# Patient Record
Sex: FEMALE | Race: WHITE | NOT HISPANIC OR LATINO | Employment: OTHER | URBAN - METROPOLITAN AREA
[De-identification: names, ages, dates, MRNs, and addresses within clinical notes are randomized per-mention and may not be internally consistent; named-entity substitution may affect disease eponyms.]

---

## 2018-03-23 LAB — HCV AB SER-ACNC: NEGATIVE

## 2019-05-22 LAB
CHOLEST SERPL-MSCNC: 231 MG/DL (ref 0–200)
HDL/CHOLESTEROL RATIO: 3.3
HDLC SERPL-MCNC: 71 MG/DL
LDLC SERPL CALC-MCNC: 139 MG/DL
TRIGL SERPL-MCNC: 104 MG/DL

## 2020-01-31 ENCOUNTER — PATIENT MESSAGE (OUTPATIENT)
Dept: FAMILY MEDICINE | Facility: CLINIC | Age: 70
End: 2020-01-31

## 2020-01-31 ENCOUNTER — PATIENT OUTREACH (OUTPATIENT)
Dept: ADMINISTRATIVE | Facility: HOSPITAL | Age: 70
End: 2020-01-31

## 2020-01-31 PROBLEM — M85.89 OSTEOPENIA OF MULTIPLE SITES: Status: ACTIVE | Noted: 2018-03-20

## 2020-01-31 PROBLEM — Z90.79 STATUS POST TAH-BSO: Status: ACTIVE | Noted: 2019-05-01

## 2020-01-31 PROBLEM — Z97.4 DOES USE HEARING AID: Status: ACTIVE | Noted: 2018-03-20

## 2020-01-31 PROBLEM — Z90.710 STATUS POST TAH-BSO: Status: ACTIVE | Noted: 2019-05-01

## 2020-01-31 PROBLEM — Z78.0 MENOPAUSE: Status: ACTIVE | Noted: 2018-03-20

## 2020-01-31 PROBLEM — H25.093 AGE-RELATED INCIPIENT CATARACT OF BOTH EYES: Status: ACTIVE | Noted: 2018-03-20

## 2020-01-31 PROBLEM — K57.30 DIVERTICULOSIS OF COLON: Status: ACTIVE | Noted: 2018-03-20

## 2020-01-31 PROBLEM — Z86.79 HISTORY OF HYPERTENSION: Status: ACTIVE | Noted: 2019-05-01

## 2020-01-31 PROBLEM — Z90.722 STATUS POST TAH-BSO: Status: ACTIVE | Noted: 2019-05-01

## 2020-01-31 PROBLEM — E78.2 MIXED HYPERLIPIDEMIA: Status: ACTIVE | Noted: 2019-07-22

## 2020-01-31 RX ORDER — CALCIUM CARBONATE/VITAMIN D3 500 MG-10
1 TABLET,CHEWABLE ORAL 2 TIMES DAILY
COMMUNITY

## 2020-01-31 RX ORDER — ATORVASTATIN CALCIUM 20 MG/1
20 TABLET, FILM COATED ORAL DAILY
COMMUNITY
Start: 2019-04-01 | End: 2020-03-03 | Stop reason: SDUPTHER

## 2020-01-31 RX ORDER — ATORVASTATIN CALCIUM 20 MG/1
20 TABLET, FILM COATED ORAL DAILY
Qty: 90 TABLET | Refills: 0 | Status: CANCELLED | OUTPATIENT
Start: 2020-01-31

## 2020-01-31 NOTE — PROGRESS NOTES
Mammogram and Dexa scan performed at [facility] sent to [facility] for media upload. External labs entered from [facility].

## 2020-02-05 ENCOUNTER — PATIENT OUTREACH (OUTPATIENT)
Dept: ADMINISTRATIVE | Facility: HOSPITAL | Age: 70
End: 2020-02-05

## 2020-02-20 PROBLEM — H25.811 COMBINED FORM OF AGE-RELATED CATARACT, RIGHT EYE: Status: ACTIVE | Noted: 2019-12-10

## 2020-02-20 PROBLEM — H25.9 SENILE CATARACT OF LEFT EYE: Status: ACTIVE | Noted: 2019-11-26

## 2020-03-03 ENCOUNTER — PATIENT MESSAGE (OUTPATIENT)
Dept: FAMILY MEDICINE | Facility: CLINIC | Age: 70
End: 2020-03-03

## 2020-03-03 ENCOUNTER — OFFICE VISIT (OUTPATIENT)
Dept: FAMILY MEDICINE | Facility: CLINIC | Age: 70
End: 2020-03-03
Payer: MEDICARE

## 2020-03-03 VITALS
DIASTOLIC BLOOD PRESSURE: 89 MMHG | RESPIRATION RATE: 16 BRPM | OXYGEN SATURATION: 97 % | WEIGHT: 164 LBS | HEART RATE: 101 BPM | TEMPERATURE: 98 F | HEIGHT: 69 IN | BODY MASS INDEX: 24.29 KG/M2 | SYSTOLIC BLOOD PRESSURE: 147 MMHG

## 2020-03-03 DIAGNOSIS — E55.9 VITAMIN D INSUFFICIENCY: ICD-10-CM

## 2020-03-03 DIAGNOSIS — R73.01 IMPAIRED FASTING BLOOD SUGAR: ICD-10-CM

## 2020-03-03 DIAGNOSIS — J30.1 SEASONAL ALLERGIC RHINITIS DUE TO POLLEN: ICD-10-CM

## 2020-03-03 DIAGNOSIS — Z86.79 HISTORY OF HYPERTENSION: ICD-10-CM

## 2020-03-03 DIAGNOSIS — Z87.898 HISTORY OF PREDIABETES: ICD-10-CM

## 2020-03-03 DIAGNOSIS — M85.89 OSTEOPENIA OF MULTIPLE SITES: Chronic | ICD-10-CM

## 2020-03-03 DIAGNOSIS — Z97.4 WEARS HEARING AID: Chronic | ICD-10-CM

## 2020-03-03 DIAGNOSIS — E78.2 MIXED HYPERLIPIDEMIA: Primary | Chronic | ICD-10-CM

## 2020-03-03 DIAGNOSIS — H90.3 BILATERAL SENSORINEURAL HEARING LOSS: Chronic | ICD-10-CM

## 2020-03-03 PROBLEM — K63.5 COLON POLYP: Chronic | Status: ACTIVE | Noted: 2020-03-03

## 2020-03-03 PROBLEM — K63.5 COLON POLYP: Status: ACTIVE | Noted: 2020-03-03

## 2020-03-03 PROBLEM — K57.30 DIVERTICULOSIS OF COLON: Chronic | Status: ACTIVE | Noted: 2018-03-20

## 2020-03-03 PROCEDURE — 99213 OFFICE O/P EST LOW 20 MIN: CPT | Mod: PBBFAC,PO | Performed by: INTERNAL MEDICINE

## 2020-03-03 PROCEDURE — 99999 PR PBB SHADOW E&M-EST. PATIENT-LVL III: CPT | Mod: PBBFAC,,, | Performed by: INTERNAL MEDICINE

## 2020-03-03 PROCEDURE — 99214 OFFICE O/P EST MOD 30 MIN: CPT | Mod: S$PBB,,, | Performed by: INTERNAL MEDICINE

## 2020-03-03 PROCEDURE — 99999 PR PBB SHADOW E&M-EST. PATIENT-LVL III: ICD-10-PCS | Mod: PBBFAC,,, | Performed by: INTERNAL MEDICINE

## 2020-03-03 PROCEDURE — 99214 PR OFFICE/OUTPT VISIT, EST, LEVL IV, 30-39 MIN: ICD-10-PCS | Mod: S$PBB,,, | Performed by: INTERNAL MEDICINE

## 2020-03-03 RX ORDER — METHYLPREDNISOLONE 4 MG/1
TABLET ORAL
Qty: 21 TABLET | Refills: 0 | Status: SHIPPED | OUTPATIENT
Start: 2020-03-03 | End: 2020-03-17

## 2020-03-03 RX ORDER — FLUTICASONE PROPIONATE 50 MCG
SPRAY, SUSPENSION (ML) NASAL
Qty: 48 G | Refills: 12 | Status: SHIPPED | OUTPATIENT
Start: 2020-03-03 | End: 2021-02-03 | Stop reason: SDUPTHER

## 2020-03-03 RX ORDER — ATORVASTATIN CALCIUM 20 MG/1
20 TABLET, FILM COATED ORAL DAILY
Qty: 90 TABLET | Refills: 2 | Status: SHIPPED | OUTPATIENT
Start: 2020-03-03 | End: 2021-08-12 | Stop reason: SDUPTHER

## 2020-03-04 ENCOUNTER — LAB VISIT (OUTPATIENT)
Dept: LAB | Facility: HOSPITAL | Age: 70
End: 2020-03-04
Attending: INTERNAL MEDICINE
Payer: MEDICARE

## 2020-03-04 DIAGNOSIS — E78.2 MIXED HYPERLIPIDEMIA: Chronic | ICD-10-CM

## 2020-03-04 DIAGNOSIS — R73.01 IMPAIRED FASTING BLOOD SUGAR: ICD-10-CM

## 2020-03-04 DIAGNOSIS — Z87.898 HISTORY OF PREDIABETES: ICD-10-CM

## 2020-03-04 DIAGNOSIS — H90.3 BILATERAL SENSORINEURAL HEARING LOSS: Chronic | ICD-10-CM

## 2020-03-04 DIAGNOSIS — J30.1 SEASONAL ALLERGIC RHINITIS DUE TO POLLEN: ICD-10-CM

## 2020-03-04 DIAGNOSIS — E55.9 VITAMIN D INSUFFICIENCY: ICD-10-CM

## 2020-03-04 DIAGNOSIS — Z86.79 HISTORY OF HYPERTENSION: ICD-10-CM

## 2020-03-04 DIAGNOSIS — M85.89 OSTEOPENIA OF MULTIPLE SITES: Chronic | ICD-10-CM

## 2020-03-04 DIAGNOSIS — Z97.4 WEARS HEARING AID: Chronic | ICD-10-CM

## 2020-03-04 PROBLEM — R73.03 PREDIABETES: Status: ACTIVE | Noted: 2020-03-04

## 2020-03-04 PROBLEM — R74.01 TRANSAMINITIS: Status: ACTIVE | Noted: 2020-03-04

## 2020-03-04 LAB
25(OH)D3+25(OH)D2 SERPL-MCNC: 40 NG/ML (ref 30–96)
ALBUMIN SERPL BCP-MCNC: 4.1 G/DL (ref 3.5–5.2)
ALP SERPL-CCNC: 68 U/L (ref 55–135)
ALT SERPL W/O P-5'-P-CCNC: 63 U/L (ref 10–44)
ANION GAP SERPL CALC-SCNC: 8 MMOL/L (ref 8–16)
AST SERPL-CCNC: 49 U/L (ref 10–40)
BASOPHILS # BLD AUTO: 0.08 K/UL (ref 0–0.2)
BASOPHILS NFR BLD: 1.2 % (ref 0–1.9)
BILIRUB SERPL-MCNC: 0.7 MG/DL (ref 0.1–1)
BUN SERPL-MCNC: 6 MG/DL (ref 8–23)
CALCIUM SERPL-MCNC: 9.5 MG/DL (ref 8.7–10.5)
CHLORIDE SERPL-SCNC: 101 MMOL/L (ref 95–110)
CHOLEST SERPL-MCNC: 169 MG/DL (ref 120–199)
CHOLEST/HDLC SERPL: 1.9 {RATIO} (ref 2–5)
CO2 SERPL-SCNC: 27 MMOL/L (ref 23–29)
CREAT SERPL-MCNC: 0.7 MG/DL (ref 0.5–1.4)
DIFFERENTIAL METHOD: ABNORMAL
EOSINOPHIL # BLD AUTO: 0.2 K/UL (ref 0–0.5)
EOSINOPHIL NFR BLD: 3 % (ref 0–8)
ERYTHROCYTE [DISTWIDTH] IN BLOOD BY AUTOMATED COUNT: 12.6 % (ref 11.5–14.5)
EST. GFR  (AFRICAN AMERICAN): >60 ML/MIN/1.73 M^2
EST. GFR  (NON AFRICAN AMERICAN): >60 ML/MIN/1.73 M^2
ESTIMATED AVG GLUCOSE: 123 MG/DL (ref 68–131)
GLUCOSE SERPL-MCNC: 99 MG/DL (ref 70–110)
HBA1C MFR BLD HPLC: 5.9 % (ref 4–5.6)
HCT VFR BLD AUTO: 43.4 % (ref 37–48.5)
HDLC SERPL-MCNC: 87 MG/DL (ref 40–75)
HDLC SERPL: 51.5 % (ref 20–50)
HGB BLD-MCNC: 13.5 G/DL (ref 12–16)
IMM GRANULOCYTES # BLD AUTO: 0.02 K/UL (ref 0–0.04)
IMM GRANULOCYTES NFR BLD AUTO: 0.3 % (ref 0–0.5)
LDLC SERPL CALC-MCNC: 70.2 MG/DL (ref 63–159)
LYMPHOCYTES # BLD AUTO: 1.8 K/UL (ref 1–4.8)
LYMPHOCYTES NFR BLD: 26.1 % (ref 18–48)
MAGNESIUM SERPL-MCNC: 2.1 MG/DL (ref 1.6–2.6)
MCH RBC QN AUTO: 30.2 PG (ref 27–31)
MCHC RBC AUTO-ENTMCNC: 31.1 G/DL (ref 32–36)
MCV RBC AUTO: 97 FL (ref 82–98)
MONOCYTES # BLD AUTO: 0.7 K/UL (ref 0.3–1)
MONOCYTES NFR BLD: 9.9 % (ref 4–15)
NEUTROPHILS # BLD AUTO: 4 K/UL (ref 1.8–7.7)
NEUTROPHILS NFR BLD: 59.5 % (ref 38–73)
NONHDLC SERPL-MCNC: 82 MG/DL
NRBC BLD-RTO: 0 /100 WBC
PLATELET # BLD AUTO: 354 K/UL (ref 150–350)
PMV BLD AUTO: 10 FL (ref 9.2–12.9)
POTASSIUM SERPL-SCNC: 4 MMOL/L (ref 3.5–5.1)
PROT SERPL-MCNC: 7.2 G/DL (ref 6–8.4)
RBC # BLD AUTO: 4.47 M/UL (ref 4–5.4)
SODIUM SERPL-SCNC: 136 MMOL/L (ref 136–145)
TRIGL SERPL-MCNC: 59 MG/DL (ref 30–150)
TSH SERPL DL<=0.005 MIU/L-ACNC: 1.01 UIU/ML (ref 0.4–4)
WBC # BLD AUTO: 6.77 K/UL (ref 3.9–12.7)

## 2020-03-04 PROCEDURE — 83036 HEMOGLOBIN GLYCOSYLATED A1C: CPT

## 2020-03-04 PROCEDURE — 84443 ASSAY THYROID STIM HORMONE: CPT

## 2020-03-04 PROCEDURE — 82306 VITAMIN D 25 HYDROXY: CPT

## 2020-03-04 PROCEDURE — 83735 ASSAY OF MAGNESIUM: CPT

## 2020-03-04 PROCEDURE — 85025 COMPLETE CBC W/AUTO DIFF WBC: CPT

## 2020-03-04 PROCEDURE — 36415 COLL VENOUS BLD VENIPUNCTURE: CPT | Mod: PO

## 2020-03-04 PROCEDURE — 80061 LIPID PANEL: CPT

## 2020-03-04 PROCEDURE — 80053 COMPREHEN METABOLIC PANEL: CPT

## 2020-03-05 ENCOUNTER — PATIENT MESSAGE (OUTPATIENT)
Dept: FAMILY MEDICINE | Facility: CLINIC | Age: 70
End: 2020-03-05

## 2020-03-05 DIAGNOSIS — E78.2 MIXED HYPERLIPIDEMIA: Chronic | ICD-10-CM

## 2020-03-05 DIAGNOSIS — R73.03 PREDIABETES: Primary | ICD-10-CM

## 2020-03-05 DIAGNOSIS — R74.01 TRANSAMINITIS: ICD-10-CM

## 2020-03-05 RX ORDER — METFORMIN HYDROCHLORIDE 500 MG/1
500 TABLET, EXTENDED RELEASE ORAL
Qty: 90 TABLET | Refills: 1 | Status: SHIPPED | OUTPATIENT
Start: 2020-03-05 | End: 2020-03-17

## 2020-03-17 ENCOUNTER — PATIENT MESSAGE (OUTPATIENT)
Dept: FAMILY MEDICINE | Facility: CLINIC | Age: 70
End: 2020-03-17

## 2020-05-22 ENCOUNTER — PATIENT MESSAGE (OUTPATIENT)
Dept: FAMILY MEDICINE | Facility: CLINIC | Age: 70
End: 2020-05-22

## 2020-06-04 ENCOUNTER — LAB VISIT (OUTPATIENT)
Dept: LAB | Facility: HOSPITAL | Age: 70
End: 2020-06-04
Attending: INTERNAL MEDICINE
Payer: MEDICARE

## 2020-06-04 DIAGNOSIS — E78.2 MIXED HYPERLIPIDEMIA: Chronic | ICD-10-CM

## 2020-06-04 DIAGNOSIS — R73.03 PREDIABETES: ICD-10-CM

## 2020-06-04 DIAGNOSIS — R74.01 TRANSAMINITIS: ICD-10-CM

## 2020-06-04 LAB
ALBUMIN SERPL BCP-MCNC: 4.4 G/DL (ref 3.5–5.2)
ALP SERPL-CCNC: 56 U/L (ref 55–135)
ALT SERPL W/O P-5'-P-CCNC: 21 U/L (ref 10–44)
ANION GAP SERPL CALC-SCNC: 9 MMOL/L (ref 8–16)
AST SERPL-CCNC: 24 U/L (ref 10–40)
BILIRUB SERPL-MCNC: 0.7 MG/DL (ref 0.1–1)
BUN SERPL-MCNC: 7 MG/DL (ref 8–23)
CALCIUM SERPL-MCNC: 9.6 MG/DL (ref 8.7–10.5)
CHLORIDE SERPL-SCNC: 104 MMOL/L (ref 95–110)
CHOLEST SERPL-MCNC: 147 MG/DL (ref 120–199)
CHOLEST/HDLC SERPL: 1.8 {RATIO} (ref 2–5)
CO2 SERPL-SCNC: 26 MMOL/L (ref 23–29)
CREAT SERPL-MCNC: 0.7 MG/DL (ref 0.5–1.4)
EST. GFR  (AFRICAN AMERICAN): >60 ML/MIN/1.73 M^2
EST. GFR  (NON AFRICAN AMERICAN): >60 ML/MIN/1.73 M^2
ESTIMATED AVG GLUCOSE: 114 MG/DL (ref 68–131)
GLUCOSE SERPL-MCNC: 96 MG/DL (ref 70–110)
HBA1C MFR BLD HPLC: 5.6 % (ref 4–5.6)
HDLC SERPL-MCNC: 80 MG/DL (ref 40–75)
HDLC SERPL: 54.4 % (ref 20–50)
LDLC SERPL CALC-MCNC: 55.8 MG/DL (ref 63–159)
NONHDLC SERPL-MCNC: 67 MG/DL
POTASSIUM SERPL-SCNC: 4.1 MMOL/L (ref 3.5–5.1)
PROT SERPL-MCNC: 7 G/DL (ref 6–8.4)
SODIUM SERPL-SCNC: 139 MMOL/L (ref 136–145)
TRIGL SERPL-MCNC: 56 MG/DL (ref 30–150)

## 2020-06-04 PROCEDURE — 80053 COMPREHEN METABOLIC PANEL: CPT

## 2020-06-04 PROCEDURE — 36415 COLL VENOUS BLD VENIPUNCTURE: CPT | Mod: PO

## 2020-06-04 PROCEDURE — 80061 LIPID PANEL: CPT

## 2020-06-04 PROCEDURE — 83036 HEMOGLOBIN GLYCOSYLATED A1C: CPT

## 2020-06-05 RX ORDER — METFORMIN HYDROCHLORIDE 500 MG/1
1 TABLET, EXTENDED RELEASE ORAL DAILY
COMMUNITY
Start: 2020-06-03 | End: 2020-09-15

## 2020-09-15 ENCOUNTER — LAB VISIT (OUTPATIENT)
Dept: LAB | Facility: HOSPITAL | Age: 70
End: 2020-09-15
Attending: INTERNAL MEDICINE
Payer: MEDICARE

## 2020-09-15 ENCOUNTER — OFFICE VISIT (OUTPATIENT)
Dept: FAMILY MEDICINE | Facility: CLINIC | Age: 70
End: 2020-09-15
Payer: MEDICARE

## 2020-09-15 ENCOUNTER — PATIENT OUTREACH (OUTPATIENT)
Dept: ADMINISTRATIVE | Facility: HOSPITAL | Age: 70
End: 2020-09-15

## 2020-09-15 VITALS
DIASTOLIC BLOOD PRESSURE: 84 MMHG | OXYGEN SATURATION: 98 % | BODY MASS INDEX: 21.48 KG/M2 | SYSTOLIC BLOOD PRESSURE: 138 MMHG | TEMPERATURE: 98 F | HEIGHT: 69 IN | WEIGHT: 145 LBS | HEART RATE: 72 BPM

## 2020-09-15 DIAGNOSIS — Z12.31 ENCOUNTER FOR SCREENING MAMMOGRAM FOR MALIGNANT NEOPLASM OF BREAST: ICD-10-CM

## 2020-09-15 DIAGNOSIS — Z87.898 HISTORY OF PREDIABETES: ICD-10-CM

## 2020-09-15 DIAGNOSIS — Z13.1 SCREENING FOR DIABETES MELLITUS (DM): ICD-10-CM

## 2020-09-15 DIAGNOSIS — Z87.898 HISTORY OF PREDIABETES: Primary | ICD-10-CM

## 2020-09-15 DIAGNOSIS — Z13.0 SCREENING FOR DEFICIENCY ANEMIA: ICD-10-CM

## 2020-09-15 DIAGNOSIS — Z79.899 ENCOUNTER FOR LONG-TERM CURRENT USE OF MEDICATION: ICD-10-CM

## 2020-09-15 DIAGNOSIS — Z13.820 SCREENING FOR OSTEOPOROSIS: ICD-10-CM

## 2020-09-15 DIAGNOSIS — Z13.21 ENCOUNTER FOR VITAMIN DEFICIENCY SCREENING: ICD-10-CM

## 2020-09-15 DIAGNOSIS — Z13.89 SCREENING FOR HEMATURIA OR PROTEINURIA: ICD-10-CM

## 2020-09-15 DIAGNOSIS — M85.89 OSTEOPENIA OF MULTIPLE SITES: Chronic | ICD-10-CM

## 2020-09-15 DIAGNOSIS — E78.2 MIXED HYPERLIPIDEMIA: Chronic | ICD-10-CM

## 2020-09-15 DIAGNOSIS — Z97.4 WEARS HEARING AID: Chronic | ICD-10-CM

## 2020-09-15 DIAGNOSIS — H90.3 BILATERAL SENSORINEURAL HEARING LOSS: Chronic | ICD-10-CM

## 2020-09-15 DIAGNOSIS — Z13.29 SCREENING FOR THYROID DISORDER: ICD-10-CM

## 2020-09-15 DIAGNOSIS — Z86.39 HISTORY OF VITAMIN D DEFICIENCY: ICD-10-CM

## 2020-09-15 LAB
25(OH)D3+25(OH)D2 SERPL-MCNC: 56 NG/ML (ref 30–96)
ALBUMIN SERPL BCP-MCNC: 4.4 G/DL (ref 3.5–5.2)
ALP SERPL-CCNC: 57 U/L (ref 55–135)
ALT SERPL W/O P-5'-P-CCNC: 14 U/L (ref 10–44)
ANION GAP SERPL CALC-SCNC: 13 MMOL/L (ref 8–16)
AST SERPL-CCNC: 21 U/L (ref 10–40)
BASOPHILS # BLD AUTO: 0.06 K/UL (ref 0–0.2)
BASOPHILS NFR BLD: 0.9 % (ref 0–1.9)
BILIRUB SERPL-MCNC: 0.6 MG/DL (ref 0.1–1)
BUN SERPL-MCNC: 5 MG/DL (ref 8–23)
CALCIUM SERPL-MCNC: 9.1 MG/DL (ref 8.7–10.5)
CHLORIDE SERPL-SCNC: 100 MMOL/L (ref 95–110)
CHOLEST SERPL-MCNC: 153 MG/DL (ref 120–199)
CHOLEST/HDLC SERPL: 1.8 {RATIO} (ref 2–5)
CO2 SERPL-SCNC: 24 MMOL/L (ref 23–29)
CREAT SERPL-MCNC: 0.7 MG/DL (ref 0.5–1.4)
DIFFERENTIAL METHOD: ABNORMAL
EOSINOPHIL # BLD AUTO: 0.1 K/UL (ref 0–0.5)
EOSINOPHIL NFR BLD: 1.3 % (ref 0–8)
ERYTHROCYTE [DISTWIDTH] IN BLOOD BY AUTOMATED COUNT: 13.2 % (ref 11.5–14.5)
EST. GFR  (AFRICAN AMERICAN): >60 ML/MIN/1.73 M^2
EST. GFR  (NON AFRICAN AMERICAN): >60 ML/MIN/1.73 M^2
ESTIMATED AVG GLUCOSE: 111 MG/DL (ref 68–131)
GLUCOSE SERPL-MCNC: 90 MG/DL (ref 70–110)
HBA1C MFR BLD HPLC: 5.5 % (ref 4–5.6)
HCT VFR BLD AUTO: 43.5 % (ref 37–48.5)
HDLC SERPL-MCNC: 83 MG/DL (ref 40–75)
HDLC SERPL: 54.2 % (ref 20–50)
HGB BLD-MCNC: 13.9 G/DL (ref 12–16)
IMM GRANULOCYTES # BLD AUTO: 0.02 K/UL (ref 0–0.04)
IMM GRANULOCYTES NFR BLD AUTO: 0.3 % (ref 0–0.5)
LDLC SERPL CALC-MCNC: 59 MG/DL (ref 63–159)
LYMPHOCYTES # BLD AUTO: 1.8 K/UL (ref 1–4.8)
LYMPHOCYTES NFR BLD: 26.3 % (ref 18–48)
MCH RBC QN AUTO: 30.2 PG (ref 27–31)
MCHC RBC AUTO-ENTMCNC: 32 G/DL (ref 32–36)
MCV RBC AUTO: 94 FL (ref 82–98)
MONOCYTES # BLD AUTO: 0.6 K/UL (ref 0.3–1)
MONOCYTES NFR BLD: 9.2 % (ref 4–15)
NEUTROPHILS # BLD AUTO: 4.2 K/UL (ref 1.8–7.7)
NEUTROPHILS NFR BLD: 62 % (ref 38–73)
NONHDLC SERPL-MCNC: 70 MG/DL
NRBC BLD-RTO: 0 /100 WBC
PLATELET # BLD AUTO: 365 K/UL (ref 150–350)
PMV BLD AUTO: 10.4 FL (ref 9.2–12.9)
POTASSIUM SERPL-SCNC: 4 MMOL/L (ref 3.5–5.1)
PROT SERPL-MCNC: 7.1 G/DL (ref 6–8.4)
RBC # BLD AUTO: 4.61 M/UL (ref 4–5.4)
SODIUM SERPL-SCNC: 137 MMOL/L (ref 136–145)
TRIGL SERPL-MCNC: 55 MG/DL (ref 30–150)
TSH SERPL DL<=0.005 MIU/L-ACNC: 0.97 UIU/ML (ref 0.4–4)
VIT B12 SERPL-MCNC: 322 PG/ML (ref 210–950)
WBC # BLD AUTO: 6.77 K/UL (ref 3.9–12.7)

## 2020-09-15 PROCEDURE — 80053 COMPREHEN METABOLIC PANEL: CPT

## 2020-09-15 PROCEDURE — 83921 ORGANIC ACID SINGLE QUANT: CPT

## 2020-09-15 PROCEDURE — 36415 COLL VENOUS BLD VENIPUNCTURE: CPT | Mod: PO

## 2020-09-15 PROCEDURE — 84443 ASSAY THYROID STIM HORMONE: CPT

## 2020-09-15 PROCEDURE — 80061 LIPID PANEL: CPT

## 2020-09-15 PROCEDURE — 99214 OFFICE O/P EST MOD 30 MIN: CPT | Mod: PBBFAC,PO | Performed by: INTERNAL MEDICINE

## 2020-09-15 PROCEDURE — 82306 VITAMIN D 25 HYDROXY: CPT

## 2020-09-15 PROCEDURE — 85025 COMPLETE CBC W/AUTO DIFF WBC: CPT

## 2020-09-15 PROCEDURE — 83036 HEMOGLOBIN GLYCOSYLATED A1C: CPT

## 2020-09-15 PROCEDURE — 82607 VITAMIN B-12: CPT

## 2020-09-15 PROCEDURE — 99999 PR PBB SHADOW E&M-EST. PATIENT-LVL IV: ICD-10-PCS | Mod: PBBFAC,,, | Performed by: INTERNAL MEDICINE

## 2020-09-15 PROCEDURE — 99214 PR OFFICE/OUTPT VISIT, EST, LEVL IV, 30-39 MIN: ICD-10-PCS | Mod: S$PBB,,, | Performed by: INTERNAL MEDICINE

## 2020-09-15 PROCEDURE — 99214 OFFICE O/P EST MOD 30 MIN: CPT | Mod: S$PBB,,, | Performed by: INTERNAL MEDICINE

## 2020-09-15 PROCEDURE — 99999 PR PBB SHADOW E&M-EST. PATIENT-LVL IV: CPT | Mod: PBBFAC,,, | Performed by: INTERNAL MEDICINE

## 2020-09-15 NOTE — PROGRESS NOTES
Subjective:      09/15/2020    Patient ID: Maranda Zhang is a 69 y.o. female.    Chief Complaint: Hyperlipidemia and Impaired Fasting Glucose    HPI     69F here for follow up of health conditions. The patient's last visit with me was on 3/3/2020.    Since our last visit she has remained on the Lipitor and has been modifying her diet.  She has lost significant weight since our last visit.  Congratulated.  She has been compliant with her vitamin-D and calcium intake as well as weight-bearing exercises and walking.  Calcium score was done last year with mild to moderate risk.  Her lipid panel has been well controlled.  Her previous prediabetes had resolved.  She is taking the metformin once daily.  Blood pressures at home have been well controlled in today is 138/84.  Labs are due today.  Immunizations are up-to-date.  Awaiting flu shot.  She is due for repeat bone density and mammogram at the end of October.  We will coordinate these together.  Otherwise no complaints today and doing well.    She is doing low carb diet with chicken and fish. Rare meat. She does feel jittery sometimes. Does not check sugars. We discussed d/c metformin and checking labs.     Review of patient's allergies indicates:  No Known Allergies    Current Outpatient Medications:     atorvastatin (LIPITOR) 20 MG tablet, Take 1 tablet (20 mg total) by mouth once daily., Disp: 90 tablet, Rfl: 2    calcium carbonate-vitamin D3 500 mg(1,250mg) -400 unit Chew, Take 1 tablet by mouth 2 (two) times daily. , Disp: , Rfl:     fluticasone propionate (FLONASE) 50 mcg/actuation nasal spray, Use 2 puffs in each nostril q day., Disp: 48 g, Rfl: 12    metFORMIN (GLUCOPHAGE-XR) 500 MG XR 24hr tablet, Take 1 tablet by mouth once daily., Disp: , Rfl:     Past Medical History:   Diagnosis Date    Age-related incipient cataract of both eyes 3/20/2018    Last Assessment & Plan:  Chronic stable. Continue to follow with Dr. Alonzo.    Anxiety     Bilateral  sensorineural hearing loss 10/15/2013    Last Assessment & Plan:  Chronic stable. Currently uses hearing aids bilaterally.    Colon polyp     Diverticulosis of colon 3/20/2018    Last Assessment & Plan:  Chronic stable. Continue to follow with Dr. Hoff.    Hypertension     Mixed hyperlipidemia 7/22/2019    Last Assessment & Plan:  Chronic stable. Currently taking atorvastatin 20 mg daily. Continue to follow with Dr. Campa.  Complications of Hyperlipidemia discussed-Coronary Artery Disease, Stroke.  Recommendations low fat, low salt. low cholesterol diet, increase exercise to 30-60 minutes.    Osteopenia of multiple sites 3/20/2018    Last Assessment & Plan:  Chronic stable.  Currently taking calcium and vitamin D daily. Continue to follow with Dr. Campa.  Complications of osteoporosis is bone fracture.  Recommendations over 66 yo calcium 1500 mg through diet or supplementation and Vitamin D 800 IU daily.  Avoid smoking and heavy alcohol use.  Exercise daily and stay active.  Fall precautions.    Prediabetes 3/4/2020    Transaminitis 3/4/2020    Wears hearing aid      Past Surgical History:   Procedure Laterality Date    BREAST BIOPSY Left     COLONOSCOPY  2017    dr. hoff    LAPAROSCOPIC REPAIR OF FEMORAL HERNIA      TONSILLECTOMY      TOTAL ABDOMINAL HYSTERECTOMY W/ BILATERAL SALPINGOOPHORECTOMY       Family History   Problem Relation Age of Onset    Arthritis Mother     Hypertension Mother     Ovarian cancer Mother     Emphysema Father     Heart disease Father     COPD Father     Hypertension Brother     Hyperlipidemia Brother     Colon cancer Brother      Social History     Socioeconomic History    Marital status:      Spouse name: Not on file    Number of children: Not on file    Years of education: Not on file    Highest education level: Not on file   Occupational History    Not on file   Social Needs    Financial resource strain: Not hard at all    Food insecurity      "Worry: Never true     Inability: Never true    Transportation needs     Medical: No     Non-medical: No   Tobacco Use    Smoking status: Never Smoker    Smokeless tobacco: Never Used   Substance and Sexual Activity    Alcohol use: Yes     Frequency: 4 or more times a week     Drinks per session: 1 or 2     Binge frequency: Never    Drug use: Never    Sexual activity: Yes     Partners: Male   Lifestyle    Physical activity     Days per week: 4 days     Minutes per session: 20 min    Stress: To some extent   Relationships    Social connections     Talks on phone: More than three times a week     Gets together: Once a week     Attends [employer] service: Not on file     Active member of club or organization: No     Attends meetings of clubs or organizations: Never     Relationship status:    Other Topics Concern    Not on file   Social History Narrative    Not on file      Review of Systems   Constitutional: Negative for activity change, fatigue and unexpected weight change.   HENT: Positive for hearing loss. Negative for rhinorrhea and trouble swallowing.    Eyes: Negative for discharge and visual disturbance.   Respiratory: Negative for chest tightness and wheezing.    Cardiovascular: Negative for chest pain and palpitations.   Gastrointestinal: Negative for blood in stool, constipation, diarrhea and vomiting.   Endocrine: Negative for polydipsia and polyuria.   Genitourinary: Negative for difficulty urinating, dysuria, hematuria and menstrual problem.   Musculoskeletal: Negative for arthralgias, joint swelling and neck pain.   Skin: Negative.    Neurological: Negative for weakness and headaches.   Psychiatric/Behavioral: Negative for confusion and dysphoric mood.         Objective:     Body mass index is 21.41 kg/m².  /84   Pulse 72   Temp 97.9 °F (36.6 °C)   Ht 5' 9" (1.753 m)   Wt 65.8 kg (145 lb)   SpO2 98%   BMI 21.41 kg/m²       Physical Exam  Vitals signs and nursing note reviewed. "   Constitutional:       General: She is not in acute distress.     Appearance: She is well-developed. She is not diaphoretic.   HENT:      Head: Normocephalic and atraumatic.      Right Ear: External ear normal.      Left Ear: External ear normal.      Mouth/Throat:      Pharynx: No oropharyngeal exudate.   Eyes:      General:         Right eye: No discharge.         Left eye: No discharge.      Conjunctiva/sclera: Conjunctivae normal.   Neck:      Musculoskeletal: Normal range of motion and neck supple.   Cardiovascular:      Rate and Rhythm: Normal rate and regular rhythm.      Heart sounds: Normal heart sounds. No murmur.   Pulmonary:      Effort: Pulmonary effort is normal. No respiratory distress.      Breath sounds: Normal breath sounds. No wheezing or rales.   Abdominal:      General: Bowel sounds are normal.      Palpations: Abdomen is soft.   Musculoskeletal:         General: No tenderness.      Comments: Arthritic changes of DIP, PIP bilaterally   Lymphadenopathy:      Cervical: No cervical adenopathy.   Skin:     General: Skin is warm and dry.      Capillary Refill: Capillary refill takes 2 to 3 seconds.      Findings: No rash.   Neurological:      Mental Status: She is alert and oriented to person, place, and time.      Sensory: No sensory deficit.   Psychiatric:         Behavior: Behavior normal.         Lab Visit on 06/04/2020   Component Date Value Ref Range Status    Cholesterol 06/04/2020 147  120 - 199 mg/dL Final    Comment: The National Cholesterol Education Program (NCEP) has set the  following guidelines (reference ranges) for Cholesterol:  Optimal.....................<200 mg/dL  Borderline High.............200-239 mg/dL  High........................> or = 240 mg/dL      Triglycerides 06/04/2020 56  30 - 150 mg/dL Final    Comment: The National Cholesterol Education Program (NCEP) has set the  following guidelines (reference values) for triglycerides:  Normal......................<150  mg/dL  Borderline High.............150-199 mg/dL  High........................200-499 mg/dL      HDL 06/04/2020 80* 40 - 75 mg/dL Final    Comment: The National Cholesterol Education Program (NCEP) has set the  following guidelines (reference values) for HDL Cholesterol:  Low...............<40 mg/dL  Optimal...........>60 mg/dL      LDL Cholesterol 06/04/2020 55.8* 63.0 - 159.0 mg/dL Final    Comment: The National Cholesterol Education Program (NCEP) has set the  following guidelines (reference values) for LDL Cholesterol:  Optimal.......................<130 mg/dL  Borderline High...............130-159 mg/dL  High..........................160-189 mg/dL  Very High.....................>190 mg/dL      Hdl/Cholesterol Ratio 06/04/2020 54.4* 20.0 - 50.0 % Final    Total Cholesterol/HDL Ratio 06/04/2020 1.8* 2.0 - 5.0 Final    Non-HDL Cholesterol 06/04/2020 67  mg/dL Final    Comment: Risk category and Non-HDL cholesterol goals:  Coronary heart disease (CHD)or equivalent (10-year risk of CHD >20%):  Non-HDL cholesterol goal     <130 mg/dL  Two or more CHD risk factors and 10-year risk of CHD <= 20%:  Non-HDL cholesterol goal     <160 mg/dL  0 to 1 CHD risk factor:  Non-HDL cholesterol goal     <190 mg/dL      Sodium 06/04/2020 139  136 - 145 mmol/L Final    Potassium 06/04/2020 4.1  3.5 - 5.1 mmol/L Final    Chloride 06/04/2020 104  95 - 110 mmol/L Final    CO2 06/04/2020 26  23 - 29 mmol/L Final    Glucose 06/04/2020 96  70 - 110 mg/dL Final    BUN, Bld 06/04/2020 7* 8 - 23 mg/dL Final    Creatinine 06/04/2020 0.7  0.5 - 1.4 mg/dL Final    Calcium 06/04/2020 9.6  8.7 - 10.5 mg/dL Final    Total Protein 06/04/2020 7.0  6.0 - 8.4 g/dL Final    Albumin 06/04/2020 4.4  3.5 - 5.2 g/dL Final    Total Bilirubin 06/04/2020 0.7  0.1 - 1.0 mg/dL Final    Comment: For infants and newborns, interpretation of results should be based  on gestational age, weight and in agreement with clinical  observations.  Premature  Infant recommended reference ranges:  Up to 24 hours.............<8.0 mg/dL  Up to 48 hours............<12.0 mg/dL  3-5 days..................<15.0 mg/dL  6-29 days.................<15.0 mg/dL      Alkaline Phosphatase 06/04/2020 56  55 - 135 U/L Final    AST 06/04/2020 24  10 - 40 U/L Final    ALT 06/04/2020 21  10 - 44 U/L Final    Anion Gap 06/04/2020 9  8 - 16 mmol/L Final    eGFR if African American 06/04/2020 >60.0  >60 mL/min/1.73 m^2 Final    eGFR if non African American 06/04/2020 >60.0  >60 mL/min/1.73 m^2 Final    Comment: Calculation used to obtain the estimated glomerular filtration  rate (eGFR) is the CKD-EPI equation.       Hemoglobin A1C 06/04/2020 5.6  4.0 - 5.6 % Final    Comment: ADA Screening Guidelines:  5.7-6.4%  Consistent with prediabetes  >or=6.5%  Consistent with diabetes  High levels of fetal hemoglobin interfere with the HbA1C  assay. Heterozygous hemoglobin variants (HbS, HgC, etc)do  not significantly interfere with this assay.   However, presence of multiple variants may affect accuracy.      Estimated Avg Glucose 06/04/2020 114  68 - 131 mg/dL Final     No results found in the last 24 hours.   Assessment:       ICD-10-CM ICD-9-CM   1. History of prediabetes  Z87.898 V12.29   2. Bilateral sensorineural hearing loss  H90.3 389.18   3. Wears hearing aid  Z97.4 V45.89   4. Mixed hyperlipidemia  E78.2 272.2   5. Osteopenia of multiple sites  M85.89 733.90   6. Screening for hematuria or proteinuria  Z13.89 V82.9   7. Screening for osteoporosis  Z13.820 V82.81   8. Screening for diabetes mellitus (DM)  Z13.1 V77.1   9. Screening for thyroid disorder  Z13.29 V77.0   10. Screening for deficiency anemia  Z13.0 V78.1   11. Encounter for vitamin deficiency screening  Z13.21 V77.99   12. Encounter for long-term current use of medication  Z79.899 V58.69   13. Encounter for screening mammogram for malignant neoplasm of breast  Z12.31 V76.12   14. History of vitamin D deficiency  Z86.39  V12.1       Plan:     #HLD  -Lipid panel 3/2018: chol 241, tri 80, hdl 77, ldl 148  -Lipid panel 5/22/19: chol 231, tri 104, hdl 71, ldl 139  -CT Calcium score 6/7/19: calcium score 50. Mild plaque burden.  -Continue Lipitor 20 mg daily --> may be able to reduce to 10 mg  -repeat   Lab Results   Component Value Date    CHOL 147 06/04/2020    TRIG 56 06/04/2020    HDL 80 (H) 06/04/2020    LDLCALC 55.8 (L) 06/04/2020     #BMI 24.22 --> 21.41  Body mass index is 21.41 kg/m².  Wt Readings from Last 1 Encounters:   09/15/20 0759 65.8 kg (145 lb)   -Continue healthy diet, exercise (weight bearing included)  -3/2020: 164 lb --> 9/15: 145 lb. Congratulated on weight loss!    #History of HTN  -Likely white coat. Elevated today. Normal last visit.   -2/23/17 m/c negative --> 5/22/19: 26, cmp normal (gfr >60, cr 0.57), TSH 1.66, cbc normal   -Last eye exam with Dr. Butler on 1/7/20. No retinopathy.  -continue monitor at home (likely due to allergies)    #Elevated liver enzymes, resolved   -AST/ALT 49/63 on 3/4/20. Decreased in June to 24/21    #Osteopenia   #TIM with BSO  -DEXA 9/19/18: osteopenia. Repeat   -Continue maldonado, vit D   -5/22/19: vit D 29.3  Lab Results   Component Value Date    [id number] 40 03/04/2020     #Prediabetes, resolved  -5/29/13: ha1c 6.1%.   -3/2020: 5.9% --> 6/4/20: 5.6%  -stop metformin xr 500 mg daily  Lab Results   Component Value Date    HGBA1C 5.6 06/04/2020     #HCM  -Mammogram: 10/23/19. BIRADS 2. Repeat next month with dexa  -Colonoscopy: 2/2/17. Dr. Malave. Repeat in 3 years. She will check into this.   -Hep C screening: negative 3/23/18    Has my chart. Follow up in 6 months.     Health Maintenance Topics with due status: Not Due       Topic Last Completion Date    Colorectal Cancer Screening 02/02/2017    TETANUS VACCINE 02/23/2017    Lipid Panel 06/04/2020       Immunization History   Administered Date(s) Administered    Influenza 09/01/2019    Influenza - High Dose - PF (65 years and  older) 11/29/2016, 09/27/2017, 10/25/2019    Influenza - Quadrivalent 09/12/2018    Influenza - Quadrivalent - PF *Preferred* (6 months and older) 10/18/2014, 09/23/2015    Influenza - Trivalent (ADULT) 09/23/2015    Influenza - Trivalent - PF (ADULT) 09/01/2013, 10/18/2014    Pneumococcal Conjugate - 13 Valent 02/23/2017    Pneumococcal Polysaccharide - 23 Valent 02/01/2016    Tdap 02/23/2017       B[place] M.D.    Orders Placed This Encounter   Procedures    Mammo Digital Screening Bilat w/ Joe    DXA Bone Density Spine And Hip    Hemoglobin A1C    Lipid Panel    Comprehensive metabolic panel    CBC auto differential    TSH    Vitamin D    Microalbumin/creatinine urine ratio    Vitamin B12    Methylmalonic Acid, Serum

## 2020-09-17 ENCOUNTER — PATIENT MESSAGE (OUTPATIENT)
Dept: FAMILY MEDICINE | Facility: CLINIC | Age: 70
End: 2020-09-17

## 2020-09-21 LAB — METHYLMALONATE SERPL-SCNC: 0.2 UMOL/L

## 2020-11-02 ENCOUNTER — TELEPHONE (OUTPATIENT)
Dept: ADMINISTRATIVE | Facility: HOSPITAL | Age: 70
End: 2020-11-02

## 2020-11-03 ENCOUNTER — HOSPITAL ENCOUNTER (OUTPATIENT)
Dept: RADIOLOGY | Facility: HOSPITAL | Age: 70
Discharge: HOME | End: 2020-11-03
Attending: INTERNAL MEDICINE
Payer: MEDICARE

## 2020-11-03 VITALS — HEIGHT: 69 IN | BODY MASS INDEX: 21.48 KG/M2 | WEIGHT: 145 LBS

## 2020-11-03 DIAGNOSIS — Z13.820 SCREENING FOR OSTEOPOROSIS: ICD-10-CM

## 2020-11-03 DIAGNOSIS — Z12.31 ENCOUNTER FOR SCREENING MAMMOGRAM FOR MALIGNANT NEOPLASM OF BREAST: ICD-10-CM

## 2020-11-03 DIAGNOSIS — M85.89 OSTEOPENIA OF MULTIPLE SITES: ICD-10-CM

## 2020-11-03 PROCEDURE — 77067 MAMMO DIGITAL SCREENING BILAT WITH TOMO: ICD-10-PCS | Mod: 26,,, | Performed by: RADIOLOGY

## 2020-11-03 PROCEDURE — 77063 MAMMO DIGITAL SCREENING BILAT WITH TOMO: ICD-10-PCS | Mod: 26,,, | Performed by: RADIOLOGY

## 2020-11-03 PROCEDURE — 77067 SCR MAMMO BI INCL CAD: CPT | Mod: 26,,, | Performed by: RADIOLOGY

## 2020-11-03 PROCEDURE — 77080 DEXA BONE DENSITY SPINE HIP: ICD-10-PCS | Mod: 26,,, | Performed by: RADIOLOGY

## 2020-11-03 PROCEDURE — 77080 DXA BONE DENSITY AXIAL: CPT | Mod: TC,PO

## 2020-11-03 PROCEDURE — 77080 DXA BONE DENSITY AXIAL: CPT | Mod: 26,,, | Performed by: RADIOLOGY

## 2020-11-03 PROCEDURE — 77063 BREAST TOMOSYNTHESIS BI: CPT | Mod: 26,,, | Performed by: RADIOLOGY

## 2020-11-03 PROCEDURE — 77067 SCR MAMMO BI INCL CAD: CPT | Mod: TC,PO

## 2021-01-17 ENCOUNTER — PATIENT MESSAGE (OUTPATIENT)
Dept: FAMILY MEDICINE | Facility: CLINIC | Age: 71
End: 2021-01-17

## 2021-01-17 DIAGNOSIS — Z13.21 ENCOUNTER FOR VITAMIN DEFICIENCY SCREENING: ICD-10-CM

## 2021-01-17 DIAGNOSIS — D75.839 THROMBOCYTOSIS: ICD-10-CM

## 2021-01-17 DIAGNOSIS — Z87.898 HISTORY OF PREDIABETES: ICD-10-CM

## 2021-01-17 DIAGNOSIS — Z13.21 ENCOUNTER FOR SCREENING FOR NUTRITIONAL DISORDER: ICD-10-CM

## 2021-01-17 DIAGNOSIS — Z79.899 ENCOUNTER FOR LONG-TERM CURRENT USE OF MEDICATION: ICD-10-CM

## 2021-01-17 DIAGNOSIS — Z13.1 SCREENING FOR DIABETES MELLITUS (DM): ICD-10-CM

## 2021-01-17 DIAGNOSIS — E78.2 MIXED HYPERLIPIDEMIA: Primary | Chronic | ICD-10-CM

## 2021-01-17 DIAGNOSIS — Z13.0 SCREENING FOR DEFICIENCY ANEMIA: ICD-10-CM

## 2021-01-17 DIAGNOSIS — R73.01 IMPAIRED FASTING GLUCOSE: ICD-10-CM

## 2021-01-19 ENCOUNTER — PATIENT MESSAGE (OUTPATIENT)
Dept: FAMILY MEDICINE | Facility: CLINIC | Age: 71
End: 2021-01-19

## 2021-01-19 DIAGNOSIS — R10.9 ABDOMINAL PAIN, UNSPECIFIED ABDOMINAL LOCATION: Primary | ICD-10-CM

## 2021-01-19 DIAGNOSIS — R63.8 OTHER SYMPTOMS AND SIGNS CONCERNING FOOD AND FLUID INTAKE: ICD-10-CM

## 2021-01-19 DIAGNOSIS — R19.5 DARK STOOLS: ICD-10-CM

## 2021-01-19 RX ORDER — SUCRALFATE 1 G/1
1 TABLET ORAL 4 TIMES DAILY
Qty: 120 TABLET | Refills: 0 | Status: SHIPPED | OUTPATIENT
Start: 2021-01-19 | End: 2021-02-18

## 2021-01-19 RX ORDER — LANSOPRAZOLE 30 MG/1
30 CAPSULE, DELAYED RELEASE ORAL DAILY
Qty: 90 CAPSULE | Refills: 0 | Status: SHIPPED | OUTPATIENT
Start: 2021-01-19 | End: 2021-11-01

## 2021-01-20 ENCOUNTER — LAB VISIT (OUTPATIENT)
Dept: LAB | Facility: HOSPITAL | Age: 71
End: 2021-01-20
Attending: INTERNAL MEDICINE
Payer: MEDICARE

## 2021-01-20 ENCOUNTER — TELEPHONE (OUTPATIENT)
Dept: FAMILY MEDICINE | Facility: CLINIC | Age: 71
End: 2021-01-20

## 2021-01-20 DIAGNOSIS — R63.8 OTHER SYMPTOMS AND SIGNS CONCERNING FOOD AND FLUID INTAKE: ICD-10-CM

## 2021-01-20 DIAGNOSIS — R10.9 ABDOMINAL PAIN, UNSPECIFIED ABDOMINAL LOCATION: ICD-10-CM

## 2021-01-20 DIAGNOSIS — Z79.899 ENCOUNTER FOR LONG-TERM CURRENT USE OF MEDICATION: ICD-10-CM

## 2021-01-20 DIAGNOSIS — K57.30 DIVERTICULOSIS OF COLON: Chronic | ICD-10-CM

## 2021-01-20 DIAGNOSIS — Z13.1 SCREENING FOR DIABETES MELLITUS (DM): ICD-10-CM

## 2021-01-20 DIAGNOSIS — R19.5 DARK STOOLS: ICD-10-CM

## 2021-01-20 DIAGNOSIS — E78.2 MIXED HYPERLIPIDEMIA: Chronic | ICD-10-CM

## 2021-01-20 DIAGNOSIS — R73.01 IMPAIRED FASTING GLUCOSE: ICD-10-CM

## 2021-01-20 DIAGNOSIS — D75.839 THROMBOCYTOSIS: ICD-10-CM

## 2021-01-20 DIAGNOSIS — R19.5 DARK STOOLS: Primary | ICD-10-CM

## 2021-01-20 DIAGNOSIS — Z13.0 SCREENING FOR DEFICIENCY ANEMIA: ICD-10-CM

## 2021-01-20 DIAGNOSIS — Z13.21 ENCOUNTER FOR SCREENING FOR NUTRITIONAL DISORDER: ICD-10-CM

## 2021-01-20 DIAGNOSIS — Z87.898 HISTORY OF PREDIABETES: ICD-10-CM

## 2021-01-20 DIAGNOSIS — Z13.21 ENCOUNTER FOR VITAMIN DEFICIENCY SCREENING: ICD-10-CM

## 2021-01-20 LAB
ALBUMIN SERPL BCP-MCNC: 4.2 G/DL (ref 3.5–5.2)
ALP SERPL-CCNC: 53 U/L (ref 55–135)
ALT SERPL W/O P-5'-P-CCNC: 20 U/L (ref 10–44)
ANION GAP SERPL CALC-SCNC: 11 MMOL/L (ref 8–16)
AST SERPL-CCNC: 24 U/L (ref 10–40)
BASOPHILS # BLD AUTO: 0.06 K/UL (ref 0–0.2)
BASOPHILS NFR BLD: 1 % (ref 0–1.9)
BILIRUB SERPL-MCNC: 0.7 MG/DL (ref 0.1–1)
BUN SERPL-MCNC: 7 MG/DL (ref 8–23)
CALCIUM SERPL-MCNC: 9.3 MG/DL (ref 8.7–10.5)
CHLORIDE SERPL-SCNC: 103 MMOL/L (ref 95–110)
CO2 SERPL-SCNC: 26 MMOL/L (ref 23–29)
CREAT SERPL-MCNC: 0.7 MG/DL (ref 0.5–1.4)
DIFFERENTIAL METHOD: ABNORMAL
EOSINOPHIL # BLD AUTO: 0.1 K/UL (ref 0–0.5)
EOSINOPHIL NFR BLD: 1.2 % (ref 0–8)
ERYTHROCYTE [DISTWIDTH] IN BLOOD BY AUTOMATED COUNT: 12.8 % (ref 11.5–14.5)
EST. GFR  (AFRICAN AMERICAN): >60 ML/MIN/1.73 M^2
EST. GFR  (NON AFRICAN AMERICAN): >60 ML/MIN/1.73 M^2
FERRITIN SERPL-MCNC: 213 NG/ML (ref 20–300)
FOLATE SERPL-MCNC: 14.2 NG/ML (ref 4–24)
GLUCOSE SERPL-MCNC: 101 MG/DL (ref 70–110)
HCT VFR BLD AUTO: 41.4 % (ref 37–48.5)
HGB BLD-MCNC: 13.2 G/DL (ref 12–16)
IMM GRANULOCYTES # BLD AUTO: 0.01 K/UL (ref 0–0.04)
IMM GRANULOCYTES NFR BLD AUTO: 0.2 % (ref 0–0.5)
IRON SERPL-MCNC: 76 UG/DL (ref 30–160)
LYMPHOCYTES # BLD AUTO: 1.6 K/UL (ref 1–4.8)
LYMPHOCYTES NFR BLD: 27.6 % (ref 18–48)
MCH RBC QN AUTO: 30.9 PG (ref 27–31)
MCHC RBC AUTO-ENTMCNC: 31.9 G/DL (ref 32–36)
MCV RBC AUTO: 97 FL (ref 82–98)
MONOCYTES # BLD AUTO: 0.4 K/UL (ref 0.3–1)
MONOCYTES NFR BLD: 7.3 % (ref 4–15)
NEUTROPHILS # BLD AUTO: 3.6 K/UL (ref 1.8–7.7)
NEUTROPHILS NFR BLD: 62.7 % (ref 38–73)
NRBC BLD-RTO: 0 /100 WBC
PLATELET # BLD AUTO: 367 K/UL (ref 150–350)
PMV BLD AUTO: 10.6 FL (ref 9.2–12.9)
POTASSIUM SERPL-SCNC: 3.7 MMOL/L (ref 3.5–5.1)
PROT SERPL-MCNC: 6.9 G/DL (ref 6–8.4)
RBC # BLD AUTO: 4.27 M/UL (ref 4–5.4)
SATURATED IRON: 21 % (ref 20–50)
SODIUM SERPL-SCNC: 140 MMOL/L (ref 136–145)
TOTAL IRON BINDING CAPACITY: 364 UG/DL (ref 250–450)
TRANSFERRIN SERPL-MCNC: 246 MG/DL (ref 200–375)
WBC # BLD AUTO: 5.76 K/UL (ref 3.9–12.7)

## 2021-01-20 PROCEDURE — 83540 ASSAY OF IRON: CPT

## 2021-01-20 PROCEDURE — 83921 ORGANIC ACID SINGLE QUANT: CPT

## 2021-01-20 PROCEDURE — 80053 COMPREHEN METABOLIC PANEL: CPT

## 2021-01-20 PROCEDURE — 85025 COMPLETE CBC W/AUTO DIFF WBC: CPT

## 2021-01-20 PROCEDURE — 82728 ASSAY OF FERRITIN: CPT

## 2021-01-20 PROCEDURE — 82746 ASSAY OF FOLIC ACID SERUM: CPT

## 2021-01-20 PROCEDURE — 83036 HEMOGLOBIN GLYCOSYLATED A1C: CPT

## 2021-01-20 PROCEDURE — 36415 COLL VENOUS BLD VENIPUNCTURE: CPT | Mod: PO

## 2021-01-21 LAB
ESTIMATED AVG GLUCOSE: 111 MG/DL (ref 68–131)
HBA1C MFR BLD HPLC: 5.5 % (ref 4–5.6)

## 2021-01-22 ENCOUNTER — LAB VISIT (OUTPATIENT)
Dept: LAB | Facility: HOSPITAL | Age: 71
End: 2021-01-22
Attending: INTERNAL MEDICINE
Payer: MEDICARE

## 2021-01-22 DIAGNOSIS — R19.5 DARK STOOLS: ICD-10-CM

## 2021-01-22 PROCEDURE — 87338 HPYLORI STOOL AG IA: CPT

## 2021-01-22 PROCEDURE — 82272 OCCULT BLD FECES 1-3 TESTS: CPT

## 2021-01-23 LAB
METHYLMALONATE SERPL-SCNC: 0.32 UMOL/L
OB PNL STL: NEGATIVE

## 2021-01-28 ENCOUNTER — OFFICE VISIT (OUTPATIENT)
Dept: GASTROENTEROLOGY | Facility: CLINIC | Age: 71
End: 2021-01-28
Payer: MEDICARE

## 2021-01-28 VITALS — BODY MASS INDEX: 20.48 KG/M2 | WEIGHT: 138.25 LBS | HEIGHT: 69 IN

## 2021-01-28 DIAGNOSIS — Z87.11 HISTORY OF BLEEDING PEPTIC ULCER: ICD-10-CM

## 2021-01-28 DIAGNOSIS — Z87.39 HISTORY OF BACK PAIN: ICD-10-CM

## 2021-01-28 DIAGNOSIS — R19.5 DARK STOOLS: Primary | ICD-10-CM

## 2021-01-28 DIAGNOSIS — Z01.818 PREOP TESTING: ICD-10-CM

## 2021-01-28 PROCEDURE — 99999 PR PBB SHADOW E&M-EST. PATIENT-LVL IV: CPT | Mod: PBBFAC,,, | Performed by: NURSE PRACTITIONER

## 2021-01-28 PROCEDURE — 99214 OFFICE O/P EST MOD 30 MIN: CPT | Mod: S$PBB,,, | Performed by: NURSE PRACTITIONER

## 2021-01-28 PROCEDURE — 99999 PR PBB SHADOW E&M-EST. PATIENT-LVL IV: ICD-10-PCS | Mod: PBBFAC,,, | Performed by: NURSE PRACTITIONER

## 2021-01-28 PROCEDURE — 99214 OFFICE O/P EST MOD 30 MIN: CPT | Mod: PBBFAC,PO | Performed by: NURSE PRACTITIONER

## 2021-01-28 PROCEDURE — 99214 PR OFFICE/OUTPT VISIT, EST, LEVL IV, 30-39 MIN: ICD-10-PCS | Mod: S$PBB,,, | Performed by: NURSE PRACTITIONER

## 2021-01-29 ENCOUNTER — PATIENT MESSAGE (OUTPATIENT)
Dept: FAMILY MEDICINE | Facility: CLINIC | Age: 71
End: 2021-01-29

## 2021-01-29 LAB — H PYLORI AG STL QL IA: NOT DETECTED

## 2021-02-04 ENCOUNTER — PATIENT MESSAGE (OUTPATIENT)
Dept: FAMILY MEDICINE | Facility: CLINIC | Age: 71
End: 2021-02-04

## 2021-02-04 ENCOUNTER — OFFICE VISIT (OUTPATIENT)
Dept: FAMILY MEDICINE | Facility: CLINIC | Age: 71
End: 2021-02-04
Payer: MEDICARE

## 2021-02-04 DIAGNOSIS — R03.0 ELEVATED BLOOD PRESSURE READING: Primary | ICD-10-CM

## 2021-02-04 DIAGNOSIS — Z87.898 HISTORY OF PREDIABETES: ICD-10-CM

## 2021-02-04 DIAGNOSIS — F41.1 ANXIETY, GENERALIZED: ICD-10-CM

## 2021-02-04 PROCEDURE — 99999 PR PBB SHADOW E&M-EST. PATIENT-LVL IV: CPT | Mod: PBBFAC,,, | Performed by: INTERNAL MEDICINE

## 2021-02-04 PROCEDURE — 99999 PR PBB SHADOW E&M-EST. PATIENT-LVL IV: ICD-10-PCS | Mod: PBBFAC,,, | Performed by: INTERNAL MEDICINE

## 2021-02-04 PROCEDURE — 99214 OFFICE O/P EST MOD 30 MIN: CPT | Mod: PBBFAC,PO | Performed by: INTERNAL MEDICINE

## 2021-02-04 PROCEDURE — 99214 PR OFFICE/OUTPT VISIT, EST, LEVL IV, 30-39 MIN: ICD-10-PCS | Mod: S$PBB,,, | Performed by: INTERNAL MEDICINE

## 2021-02-04 PROCEDURE — 99214 OFFICE O/P EST MOD 30 MIN: CPT | Mod: S$PBB,,, | Performed by: INTERNAL MEDICINE

## 2021-02-04 RX ORDER — SERTRALINE HYDROCHLORIDE 25 MG/1
TABLET, FILM COATED ORAL
Qty: 30 TABLET | Refills: 0 | Status: SHIPPED | OUTPATIENT
Start: 2021-02-04 | End: 2021-03-17 | Stop reason: SDUPTHER

## 2021-02-07 DIAGNOSIS — J30.1 SEASONAL ALLERGIC RHINITIS DUE TO POLLEN: ICD-10-CM

## 2021-02-07 RX ORDER — FLUTICASONE PROPIONATE 50 MCG
SPRAY, SUSPENSION (ML) NASAL
Qty: 48 G | Refills: 2 | Status: CANCELLED | OUTPATIENT
Start: 2021-02-07

## 2021-02-17 VITALS
WEIGHT: 139 LBS | HEART RATE: 85 BPM | TEMPERATURE: 98 F | DIASTOLIC BLOOD PRESSURE: 87 MMHG | SYSTOLIC BLOOD PRESSURE: 158 MMHG | OXYGEN SATURATION: 97 % | RESPIRATION RATE: 20 BRPM | HEIGHT: 69 IN | BODY MASS INDEX: 20.59 KG/M2

## 2021-02-19 ENCOUNTER — TELEPHONE (OUTPATIENT)
Dept: GASTROENTEROLOGY | Facility: CLINIC | Age: 71
End: 2021-02-19

## 2021-03-17 ENCOUNTER — PATIENT MESSAGE (OUTPATIENT)
Dept: FAMILY MEDICINE | Facility: CLINIC | Age: 71
End: 2021-03-17

## 2021-03-17 DIAGNOSIS — L23.7 POISON IVY: Primary | ICD-10-CM

## 2021-03-17 DIAGNOSIS — F41.1 ANXIETY, GENERALIZED: ICD-10-CM

## 2021-03-17 RX ORDER — TRIAMCINOLONE ACETONIDE 1 MG/G
CREAM TOPICAL 2 TIMES DAILY
Qty: 45 G | Refills: 0 | Status: SHIPPED | OUTPATIENT
Start: 2021-03-17 | End: 2021-11-01

## 2021-03-24 RX ORDER — SERTRALINE HYDROCHLORIDE 25 MG/1
TABLET, FILM COATED ORAL
Qty: 90 TABLET | Refills: 1 | Status: SHIPPED | OUTPATIENT
Start: 2021-03-24 | End: 2021-08-27

## 2021-06-14 ENCOUNTER — TELEPHONE (OUTPATIENT)
Dept: FAMILY MEDICINE | Facility: CLINIC | Age: 71
End: 2021-06-14

## 2021-06-14 DIAGNOSIS — E78.2 MIXED HYPERLIPIDEMIA: Primary | ICD-10-CM

## 2021-08-12 DIAGNOSIS — E78.2 MIXED HYPERLIPIDEMIA: Chronic | ICD-10-CM

## 2021-08-12 RX ORDER — ATORVASTATIN CALCIUM 20 MG/1
20 TABLET, FILM COATED ORAL DAILY
Qty: 90 TABLET | Refills: 3 | Status: SHIPPED | OUTPATIENT
Start: 2021-08-12 | End: 2022-06-28

## 2021-08-26 ENCOUNTER — PATIENT MESSAGE (OUTPATIENT)
Dept: FAMILY MEDICINE | Facility: CLINIC | Age: 71
End: 2021-08-26

## 2021-10-06 ENCOUNTER — PES CALL (OUTPATIENT)
Dept: ADMINISTRATIVE | Facility: CLINIC | Age: 71
End: 2021-10-06

## 2021-10-07 ENCOUNTER — TELEPHONE (OUTPATIENT)
Dept: FAMILY MEDICINE | Facility: CLINIC | Age: 71
End: 2021-10-07

## 2021-10-07 DIAGNOSIS — Z23 IMMUNIZATION DUE: Primary | ICD-10-CM

## 2021-11-01 ENCOUNTER — OFFICE VISIT (OUTPATIENT)
Dept: FAMILY MEDICINE | Facility: CLINIC | Age: 71
End: 2021-11-01
Payer: MEDICARE

## 2021-11-01 VITALS
TEMPERATURE: 98 F | HEIGHT: 69 IN | HEART RATE: 88 BPM | BODY MASS INDEX: 21.03 KG/M2 | SYSTOLIC BLOOD PRESSURE: 133 MMHG | DIASTOLIC BLOOD PRESSURE: 75 MMHG | WEIGHT: 142 LBS

## 2021-11-01 DIAGNOSIS — K63.5 POLYP OF COLON, UNSPECIFIED PART OF COLON, UNSPECIFIED TYPE: Chronic | ICD-10-CM

## 2021-11-01 DIAGNOSIS — Z12.31 ENCOUNTER FOR SCREENING MAMMOGRAM FOR BREAST CANCER: ICD-10-CM

## 2021-11-01 DIAGNOSIS — F41.1 ANXIETY, GENERALIZED: ICD-10-CM

## 2021-11-01 DIAGNOSIS — K57.30 DIVERTICULOSIS OF COLON: Chronic | ICD-10-CM

## 2021-11-01 DIAGNOSIS — M85.89 OSTEOPENIA OF MULTIPLE SITES: Chronic | ICD-10-CM

## 2021-11-01 DIAGNOSIS — R73.01 IMPAIRED FASTING GLUCOSE: ICD-10-CM

## 2021-11-01 DIAGNOSIS — H90.3 BILATERAL SENSORINEURAL HEARING LOSS: Chronic | ICD-10-CM

## 2021-11-01 DIAGNOSIS — E78.2 MIXED HYPERLIPIDEMIA: Chronic | ICD-10-CM

## 2021-11-01 DIAGNOSIS — Z00.00 ANNUAL PHYSICAL EXAM: Primary | ICD-10-CM

## 2021-11-01 PROCEDURE — 99999 PR PBB SHADOW E&M-EST. PATIENT-LVL IV: ICD-10-PCS | Mod: PBBFAC,,, | Performed by: NURSE PRACTITIONER

## 2021-11-01 PROCEDURE — G0439 PR MEDICARE ANNUAL WELLNESS SUBSEQUENT VISIT: ICD-10-PCS | Mod: ,,, | Performed by: NURSE PRACTITIONER

## 2021-11-01 PROCEDURE — 99999 PR PBB SHADOW E&M-EST. PATIENT-LVL IV: CPT | Mod: PBBFAC,,, | Performed by: NURSE PRACTITIONER

## 2021-11-01 PROCEDURE — 99214 OFFICE O/P EST MOD 30 MIN: CPT | Mod: PBBFAC,PO | Performed by: NURSE PRACTITIONER

## 2021-11-01 PROCEDURE — G0439 PPPS, SUBSEQ VISIT: HCPCS | Mod: ,,, | Performed by: NURSE PRACTITIONER

## 2021-11-01 RX ORDER — CYCLOSPORINE 0.5 MG/ML
EMULSION OPHTHALMIC
COMMUNITY
Start: 2021-10-06 | End: 2022-08-25

## 2021-11-02 ENCOUNTER — IMMUNIZATION (OUTPATIENT)
Dept: FAMILY MEDICINE | Facility: CLINIC | Age: 71
End: 2021-11-02
Payer: MEDICARE

## 2021-11-02 DIAGNOSIS — Z23 NEED FOR VACCINATION: Primary | ICD-10-CM

## 2021-11-02 PROCEDURE — 91300 COVID-19, MRNA, LNP-S, PF, 30 MCG/0.3 ML DOSE VACCINE: CPT | Mod: PBBFAC,PO

## 2021-11-06 ENCOUNTER — PATIENT MESSAGE (OUTPATIENT)
Dept: FAMILY MEDICINE | Facility: CLINIC | Age: 71
End: 2021-11-06
Payer: MEDICARE

## 2021-11-18 ENCOUNTER — PATIENT MESSAGE (OUTPATIENT)
Dept: FAMILY MEDICINE | Facility: CLINIC | Age: 71
End: 2021-11-18

## 2021-11-18 ENCOUNTER — OFFICE VISIT (OUTPATIENT)
Dept: FAMILY MEDICINE | Facility: CLINIC | Age: 71
End: 2021-11-18
Payer: MEDICARE

## 2021-11-18 DIAGNOSIS — N39.0 URINARY TRACT INFECTION WITHOUT HEMATURIA, SITE UNSPECIFIED: Primary | ICD-10-CM

## 2021-11-18 DIAGNOSIS — R30.0 DYSURIA: ICD-10-CM

## 2021-11-18 PROCEDURE — 99213 OFFICE O/P EST LOW 20 MIN: CPT | Mod: 95,,, | Performed by: NURSE PRACTITIONER

## 2021-11-18 PROCEDURE — 99213 PR OFFICE/OUTPT VISIT, EST, LEVL III, 20-29 MIN: ICD-10-PCS | Mod: 95,,, | Performed by: NURSE PRACTITIONER

## 2021-11-18 RX ORDER — NITROFURANTOIN 25; 75 MG/1; MG/1
100 CAPSULE ORAL 2 TIMES DAILY
Qty: 20 CAPSULE | Refills: 0 | Status: SHIPPED | OUTPATIENT
Start: 2021-11-18 | End: 2021-11-22

## 2021-11-18 RX ORDER — CEPHALEXIN 500 MG/1
500 CAPSULE ORAL 2 TIMES DAILY
COMMUNITY
Start: 2021-11-14 | End: 2021-11-22

## 2021-11-18 RX ORDER — PHENAZOPYRIDINE HYDROCHLORIDE 200 MG/1
200 TABLET, FILM COATED ORAL 2 TIMES DAILY PRN
Qty: 6 TABLET | Refills: 0 | Status: SHIPPED | OUTPATIENT
Start: 2021-11-18 | End: 2021-11-21

## 2021-11-22 ENCOUNTER — PATIENT MESSAGE (OUTPATIENT)
Dept: FAMILY MEDICINE | Facility: CLINIC | Age: 71
End: 2021-11-22
Payer: MEDICARE

## 2021-11-22 ENCOUNTER — TELEPHONE (OUTPATIENT)
Dept: FAMILY MEDICINE | Facility: CLINIC | Age: 71
End: 2021-11-22
Payer: MEDICARE

## 2021-11-22 DIAGNOSIS — B96.5 PSEUDOMONAS URINARY TRACT INFECTION: ICD-10-CM

## 2021-11-22 DIAGNOSIS — N39.0 PSEUDOMONAS URINARY TRACT INFECTION: ICD-10-CM

## 2021-11-22 DIAGNOSIS — N39.0 URINARY TRACT INFECTION WITHOUT HEMATURIA, SITE UNSPECIFIED: Primary | ICD-10-CM

## 2021-11-22 RX ORDER — CIPROFLOXACIN 500 MG/1
500 TABLET ORAL 2 TIMES DAILY
Qty: 14 TABLET | Refills: 0 | Status: SHIPPED | OUTPATIENT
Start: 2021-11-22 | End: 2021-11-29

## 2021-12-06 ENCOUNTER — PATIENT MESSAGE (OUTPATIENT)
Dept: FAMILY MEDICINE | Facility: CLINIC | Age: 71
End: 2021-12-06
Payer: MEDICARE

## 2021-12-08 ENCOUNTER — HOSPITAL ENCOUNTER (OUTPATIENT)
Dept: RADIOLOGY | Facility: HOSPITAL | Age: 71
Discharge: HOME | End: 2021-12-08
Attending: NURSE PRACTITIONER
Payer: MEDICARE

## 2021-12-08 VITALS — HEIGHT: 69 IN | BODY MASS INDEX: 21.03 KG/M2 | WEIGHT: 142 LBS

## 2021-12-08 DIAGNOSIS — Z12.31 ENCOUNTER FOR SCREENING MAMMOGRAM FOR BREAST CANCER: ICD-10-CM

## 2021-12-08 PROCEDURE — 77063 BREAST TOMOSYNTHESIS BI: CPT | Mod: 26,,, | Performed by: RADIOLOGY

## 2021-12-08 PROCEDURE — 77067 MAMMO DIGITAL SCREENING BILAT WITH TOMO: ICD-10-PCS | Mod: 26,,, | Performed by: RADIOLOGY

## 2021-12-08 PROCEDURE — 77067 SCR MAMMO BI INCL CAD: CPT | Mod: 26,,, | Performed by: RADIOLOGY

## 2021-12-08 PROCEDURE — 77063 MAMMO DIGITAL SCREENING BILAT WITH TOMO: ICD-10-PCS | Mod: 26,,, | Performed by: RADIOLOGY

## 2021-12-08 PROCEDURE — 77067 SCR MAMMO BI INCL CAD: CPT | Mod: TC,PO

## 2022-01-28 DIAGNOSIS — F41.1 ANXIETY, GENERALIZED: ICD-10-CM

## 2022-01-28 RX ORDER — SERTRALINE HYDROCHLORIDE 25 MG/1
TABLET, FILM COATED ORAL
Qty: 90 TABLET | Refills: 0 | Status: SHIPPED | OUTPATIENT
Start: 2022-01-28 | End: 2022-03-31

## 2022-01-28 NOTE — TELEPHONE ENCOUNTER
Care Due:                  Date            Visit Type   Department     Provider  --------------------------------------------------------------------------------    Last Visit: None Found      None         None Found  Next Visit: None Scheduled  None         None Found                                                            Last  Test          Frequency    Reason                     Performed    Due Date  --------------------------------------------------------------------------------    Office Visit  12 months..  atorvastatin, sertraline.  Not Found    Overdue    Powered by [employer]. Reference number: [id number].   1/28/2022 5:22:08 PM CST

## 2022-03-14 ENCOUNTER — PATIENT MESSAGE (OUTPATIENT)
Dept: FAMILY MEDICINE | Facility: CLINIC | Age: 72
End: 2022-03-14
Payer: MEDICARE

## 2022-03-14 DIAGNOSIS — H91.90 HEARING LOSS, UNSPECIFIED HEARING LOSS TYPE, UNSPECIFIED LATERALITY: ICD-10-CM

## 2022-03-14 DIAGNOSIS — Z13.820 SCREENING FOR OSTEOPOROSIS: ICD-10-CM

## 2022-03-14 DIAGNOSIS — Z97.4 WEARS HEARING AID: Primary | ICD-10-CM

## 2022-03-14 NOTE — TELEPHONE ENCOUNTER
I have signed for the following orders AND/OR meds.  Please call the patient and ask the patient to schedule the testing AND/OR inform about any medications that were sent. Medications have been sent to pharmacy listed below      Orders Placed This Encounter   Procedures    Ambulatory referral/consult to Audiology     Standing Status:   Future     Standing Expiration Date:   4/14/2023     Referral Priority:   Routine     Referral Type:   Audiology Exam     Referral Reason:   Specialty Services Required     Requested Specialty:   Audiology     Number of Visits Requested:   1              [id number] - [place] - [address]  [address]  [place] [address]  Phone: [phone] Fax: [phone]    [facility] [place] [place] - [address] AT [facility] Sites  [address]  [facility] [address]  Phone: [phone] Fax: [phone]

## 2022-03-31 ENCOUNTER — OFFICE VISIT (OUTPATIENT)
Dept: FAMILY MEDICINE | Facility: CLINIC | Age: 72
End: 2022-03-31
Payer: MEDICARE

## 2022-03-31 VITALS
TEMPERATURE: 98 F | DIASTOLIC BLOOD PRESSURE: 81 MMHG | WEIGHT: 140 LBS | BODY MASS INDEX: 20.73 KG/M2 | SYSTOLIC BLOOD PRESSURE: 135 MMHG | HEART RATE: 83 BPM | HEIGHT: 69 IN

## 2022-03-31 DIAGNOSIS — R73.03 PREDIABETES: ICD-10-CM

## 2022-03-31 DIAGNOSIS — E78.2 MIXED HYPERLIPIDEMIA: Primary | Chronic | ICD-10-CM

## 2022-03-31 DIAGNOSIS — Z79.899 ENCOUNTER FOR LONG-TERM (CURRENT) USE OF HIGH-RISK MEDICATION: ICD-10-CM

## 2022-03-31 DIAGNOSIS — R49.0 HOARSENESS: ICD-10-CM

## 2022-03-31 DIAGNOSIS — F41.1 GAD (GENERALIZED ANXIETY DISORDER): ICD-10-CM

## 2022-03-31 PROCEDURE — 99214 OFFICE O/P EST MOD 30 MIN: CPT | Mod: S$PBB,,, | Performed by: INTERNAL MEDICINE

## 2022-03-31 PROCEDURE — 99999 PR PBB SHADOW E&M-EST. PATIENT-LVL III: ICD-10-PCS | Mod: PBBFAC,,, | Performed by: INTERNAL MEDICINE

## 2022-03-31 PROCEDURE — 99999 PR PBB SHADOW E&M-EST. PATIENT-LVL III: CPT | Mod: PBBFAC,,, | Performed by: INTERNAL MEDICINE

## 2022-03-31 PROCEDURE — 99213 OFFICE O/P EST LOW 20 MIN: CPT | Mod: PBBFAC,PO | Performed by: INTERNAL MEDICINE

## 2022-03-31 PROCEDURE — 99214 PR OFFICE/OUTPT VISIT, EST, LEVL IV, 30-39 MIN: ICD-10-PCS | Mod: S$PBB,,, | Performed by: INTERNAL MEDICINE

## 2022-03-31 RX ORDER — SERTRALINE HYDROCHLORIDE 50 MG/1
50 TABLET, FILM COATED ORAL DAILY
Qty: 90 TABLET | Refills: 1 | Status: SHIPPED | OUTPATIENT
Start: 2022-03-31 | End: 2022-03-31

## 2022-03-31 RX ORDER — SERTRALINE HYDROCHLORIDE 50 MG/1
50 TABLET, FILM COATED ORAL DAILY
Qty: 90 TABLET | Refills: 1 | Status: SHIPPED | OUTPATIENT
Start: 2022-03-31 | End: 2022-11-18 | Stop reason: SDUPTHER

## 2022-03-31 NOTE — PROGRESS NOTES
Assessment/Plan:    Problem List Items Addressed This Visit        Psychiatric    MEG (generalized anxiety disorder)    Overview     -hx of anxiety and depression with recently worsening of symptoms  -currently on zoloft 25 mg without AE  -plan to increase dose to 50 mg QD           Relevant Medications    sertraline (ZOLOFT) 50 MG tablet    Other Relevant Orders    TSH       Cardiac/Vascular    Mixed hyperlipidemia - Primary (Chronic)    Overview     -chronic condition. Currently stable.    -reports compliance with hyperlipidemia treatment as prescribed  -denies any known adverse effects of medications  -most recent labs listed below; started back on statin after below results  Lab Results   Component Value Date    CHOL 233 (H) 12/08/2021     Lab Results   Component Value Date    HDL 87 (H) 12/08/2021     Lab Results   Component Value Date    LDLCALC 134.2 12/08/2021     Lab Results   Component Value Date    TRIG 59 12/08/2021     Lab Results   Component Value Date    ALT 29 12/08/2021    AST 25 12/08/2021    ALKPHOS 60 12/08/2021    BILITOT 0.7 12/08/2021                Relevant Orders    Lipid Panel    TSH       Endocrine    Prediabetes    Overview     Lab Results   Component Value Date    HGBA1C 5.7 (H) 12/08/2021   -managing with diet and exercise             Relevant Orders    Hemoglobin A1C    TSH      Other Visit Diagnoses     Encounter for long-term (current) use of high-risk medication        Relevant Orders    TSH    Hoarseness      -hoarseness over past year but also having allergy symptoms  -start antihistamine, flonase, and mucinex  -if symptoms persist, plan to refer to ENT          Follow up in about 6 months (around 9/30/2022) for labs in June: a1c,lipid,tsh.    Jamee Hurd MD  _____________________________________________________________________________________________________________________________________________________    CC: establish care    HPI:    Patient is in clinic today as an  established patient of clinic, new to me, here to establish care. Patient is a 72 yo WF with a PMH of HLD, prediabetes and depression/anxiety.    Recent worsening symptoms of anxiety and depression. Chronic hx but recent worsening due to family stressors. Denies SI/HI. Currently on zoloft but does not feel that it has been beneficial at current dose. Denies AE of the medication.     Also discussed hoarseness. Has been present for many months but also reports symptoms of post-nasal drip and sinus congestion. Using flonase occasionally. Not taking anything else for symptoms. Denies dysphagia or odynophagia. Denies weight loss.     No other new complaints today. Remaining chronic conditions have been reviewed and remain stable. Further detail as stated above.     HM reviewed and UTD. Discussed needed vaccinations.    Past Medical History:  Past Medical History:   Diagnosis Date    Age-related incipient cataract of both eyes 3/20/2018    Last Assessment & Plan:  Chronic stable. Continue to follow with Dr. Alonzo.    Anxiety     Bilateral sensorineural hearing loss 10/15/2013    Last Assessment & Plan:  Chronic stable. Currently uses hearing aids bilaterally.    Colon polyp     Diverticulosis of colon 3/20/2018    Last Assessment & Plan:  Chronic stable. Continue to follow with Dr. Malave.    Hypertension     Mixed hyperlipidemia 7/22/2019    Last Assessment & Plan:  Chronic stable. Currently taking atorvastatin 20 mg daily. Continue to follow with Dr. Campa.  Complications of Hyperlipidemia discussed-Coronary Artery Disease, Stroke.  Recommendations low fat, low salt. low cholesterol diet, increase exercise to 30-60 minutes.    Osteopenia of multiple sites 3/20/2018    Last Assessment & Plan:  Chronic stable.  Currently taking calcium and vitamin D daily. Continue to follow with Dr. Campa.  Complications of osteoporosis is bone fracture.  Recommendations over 66 yo calcium 1500 mg through diet or  supplementation and Vitamin D 800 IU daily.  Avoid smoking and heavy alcohol use.  Exercise daily and stay active.  Fall precautions.    Prediabetes 3/4/2020    Transaminitis 3/4/2020    Wears hearing aid      Past Surgical History:   Procedure Laterality Date    BREAST BIOPSY Left     COLONOSCOPY  02/02/2017    Dr. Malave, in procedures tab: diverticulosis, otherwise unremarkable; repeat in 5 years for surveillance    COLONOSCOPY  11/05/2013    Dr. Garcia, in procedures tab: diverticulosis, colon polyps removed; biopsy report in care everywhere    EYE SURGERY  2/19    Cataract    HERNIA REPAIR  5/17    HYSTERECTOMY      LAPAROSCOPIC REPAIR OF FEMORAL HERNIA      TONSILLECTOMY      TOTAL ABDOMINAL HYSTERECTOMY W/ BILATERAL SALPINGOOPHORECTOMY      UPPER GASTROINTESTINAL ENDOSCOPY  ~2000    bleeding ulcer per patient report     Review of patient's allergies indicates:  No Known Allergies  Social History     Tobacco Use    Smoking status: Never Smoker    Smokeless tobacco: Never Used   Substance Use Topics    Alcohol use: Yes     Alcohol/week: 7.0 standard drinks     Types: 7 Glasses of wine per week    Drug use: Never     Family History   Problem Relation Age of Onset    Arthritis Mother     Hypertension Mother     Ovarian cancer Mother     Hearing loss Mother     Emphysema Father     Heart disease Father         Heart Attack    COPD Father     Cancer Brother 65        Colon    Hypertension Brother     Hyperlipidemia Brother     Colon cancer Brother 70    Crohn's disease Neg Hx     Esophageal cancer Neg Hx     Stomach cancer Neg Hx     Ulcerative colitis Neg Hx      Current Outpatient Medications on File Prior to Visit   Medication Sig Dispense Refill    atorvastatin (LIPITOR) 20 MG tablet Take 1 tablet (20 mg total) by mouth once daily. 90 tablet 3    calcium carbonate-vitamin D3 500 mg(1,250mg) -400 unit Chew Take 1 tablet by mouth 2 (two) times daily.       fluticasone propionate  "(FLONASE) 50 mcg/actuation nasal spray Use 2 puffs in each nostril q day. 48 g 2    RESTASIS 0.05 % ophthalmic emulsion       [DISCONTINUED] sertraline (ZOLOFT) 25 MG tablet TAKE 1 TABLET ONCE DAILY 90 tablet 0     No current facility-administered medications on file prior to visit.       Review of Systems   Constitutional: Negative for chills, diaphoresis, fatigue and fever.   HENT: Positive for congestion, postnasal drip and voice change. Negative for ear pain, sinus pain and sore throat.    Eyes: Negative for pain and redness.   Respiratory: Negative for cough, chest tightness and shortness of breath.    Cardiovascular: Negative for chest pain and leg swelling.   Gastrointestinal: Negative for abdominal pain, constipation, diarrhea, nausea and vomiting.   Genitourinary: Negative for dysuria and hematuria.   Musculoskeletal: Negative for arthralgias and joint swelling.   Skin: Negative for rash.   Neurological: Negative for dizziness, syncope and headaches.   Psychiatric/Behavioral: Positive for dysphoric mood. Negative for self-injury and suicidal ideas. The patient is nervous/anxious.        Vitals:    03/31/22 0904   BP: 135/81   Pulse: 83   Temp: 98.2 °F (36.8 °C)   Weight: 63.5 kg (140 lb)   Height: 5' 9" (1.753 m)       Wt Readings from Last 3 Encounters:   03/31/22 63.5 kg (140 lb)   12/08/21 64.4 kg (142 lb)   11/01/21 64.4 kg (142 lb)       Physical Exam  Constitutional:       General: She is not in acute distress.     Appearance: Normal appearance. She is well-developed.   HENT:      Head: Normocephalic and atraumatic.   Eyes:      Conjunctiva/sclera: Conjunctivae normal.   Cardiovascular:      Rate and Rhythm: Normal rate and regular rhythm.      Pulses: Normal pulses.      Heart sounds: Normal heart sounds. No murmur heard.  Pulmonary:      Effort: Pulmonary effort is normal. No respiratory distress.      Breath sounds: Normal breath sounds.   Abdominal:      General: Bowel sounds are normal. There " is no distension.      Palpations: Abdomen is soft.      Tenderness: There is no abdominal tenderness.   Musculoskeletal:         General: Normal range of motion.      Cervical back: Normal range of motion and neck supple.   Skin:     General: Skin is warm and dry.      Findings: No rash.   Neurological:      General: No focal deficit present.      Mental Status: She is alert and oriented to person, place, and time.   Psychiatric:         Mood and Affect: Mood normal.         Behavior: Behavior normal.         Thought Content: Thought content normal.         Judgment: Judgment normal.         Health Maintenance   Topic Date Due    Hemoglobin A1c  06/08/2022    DEXA Scan  11/03/2022    Mammogram  12/08/2022    Lipid Panel  12/08/2022    TETANUS VACCINE  02/23/2027    Hepatitis C Screening  Completed

## 2022-03-31 NOTE — PATIENT INSTRUCTIONS
Recommend  shingles (Shingrx) vaccines at the pharmacy.  -continue daily flonase. Add antihistamine such as zyrtec, claritin, xyzal and also consider starting plain Mucinex (no -D or -DM)

## 2022-04-28 ENCOUNTER — PATIENT MESSAGE (OUTPATIENT)
Dept: ADMINISTRATIVE | Facility: OTHER | Age: 72
End: 2022-04-28
Payer: MEDICARE

## 2022-05-27 ENCOUNTER — PATIENT MESSAGE (OUTPATIENT)
Dept: FAMILY MEDICINE | Facility: CLINIC | Age: 72
End: 2022-05-27
Payer: MEDICARE

## 2022-06-14 ENCOUNTER — LAB VISIT (OUTPATIENT)
Dept: LAB | Facility: HOSPITAL | Age: 72
End: 2022-06-14
Attending: INTERNAL MEDICINE
Payer: MEDICARE

## 2022-06-14 DIAGNOSIS — Z79.899 ENCOUNTER FOR LONG-TERM (CURRENT) USE OF HIGH-RISK MEDICATION: ICD-10-CM

## 2022-06-14 DIAGNOSIS — F41.1 GAD (GENERALIZED ANXIETY DISORDER): ICD-10-CM

## 2022-06-14 DIAGNOSIS — R73.03 PREDIABETES: ICD-10-CM

## 2022-06-14 DIAGNOSIS — E78.2 MIXED HYPERLIPIDEMIA: Chronic | ICD-10-CM

## 2022-06-14 LAB
CHOLEST SERPL-MCNC: 172 MG/DL (ref 120–199)
CHOLEST/HDLC SERPL: 2 {RATIO} (ref 2–5)
ESTIMATED AVG GLUCOSE: 114 MG/DL (ref 68–131)
HBA1C MFR BLD: 5.6 % (ref 4–5.6)
HDLC SERPL-MCNC: 87 MG/DL (ref 40–75)
HDLC SERPL: 50.6 % (ref 20–50)
LDLC SERPL CALC-MCNC: 75.2 MG/DL (ref 63–159)
NONHDLC SERPL-MCNC: 85 MG/DL
TRIGL SERPL-MCNC: 49 MG/DL (ref 30–150)
TSH SERPL DL<=0.005 MIU/L-ACNC: 1.55 UIU/ML (ref 0.4–4)

## 2022-06-14 PROCEDURE — 83036 HEMOGLOBIN GLYCOSYLATED A1C: CPT | Performed by: INTERNAL MEDICINE

## 2022-06-14 PROCEDURE — 36415 COLL VENOUS BLD VENIPUNCTURE: CPT | Mod: PO | Performed by: INTERNAL MEDICINE

## 2022-06-14 PROCEDURE — 84443 ASSAY THYROID STIM HORMONE: CPT | Performed by: INTERNAL MEDICINE

## 2022-06-14 PROCEDURE — 80061 LIPID PANEL: CPT | Performed by: INTERNAL MEDICINE

## 2022-06-18 NOTE — PROGRESS NOTES
Results have been released via [employer]. Please verify that these have been viewed by patient. If not, please call patient with results.    I have sent a message to them with the following interpretation (see below).      I have reviewed your recent blood work.     Your cholesterol is improved.    Thyroid studies are within normal limits.    Your hemoglobin A1c is also improved to 5.6 which is considered to be in normal range.    Please do not hesitate to call or message with any additional questions or concerns.    Jamee Hurd MD

## 2022-08-25 ENCOUNTER — LAB VISIT (OUTPATIENT)
Dept: LAB | Facility: HOSPITAL | Age: 72
End: 2022-08-25
Attending: INTERNAL MEDICINE
Payer: MEDICARE

## 2022-08-25 ENCOUNTER — TELEPHONE (OUTPATIENT)
Dept: FAMILY MEDICINE | Facility: CLINIC | Age: 72
End: 2022-08-25

## 2022-08-25 ENCOUNTER — PATIENT MESSAGE (OUTPATIENT)
Dept: FAMILY MEDICINE | Facility: CLINIC | Age: 72
End: 2022-08-25

## 2022-08-25 ENCOUNTER — E-VISIT (OUTPATIENT)
Dept: FAMILY MEDICINE | Facility: CLINIC | Age: 72
End: 2022-08-25
Payer: MEDICARE

## 2022-08-25 DIAGNOSIS — N30.00 ACUTE CYSTITIS WITHOUT HEMATURIA: Primary | ICD-10-CM

## 2022-08-25 DIAGNOSIS — R30.0 DYSURIA: Primary | ICD-10-CM

## 2022-08-25 DIAGNOSIS — R30.0 DYSURIA: ICD-10-CM

## 2022-08-25 LAB
BACTERIA #/AREA URNS HPF: ABNORMAL /HPF
BILIRUB UR QL STRIP: NEGATIVE
CLARITY UR: CLEAR
COLOR UR: ABNORMAL
GLUCOSE UR QL STRIP: NEGATIVE
HGB UR QL STRIP: NEGATIVE
KETONES UR QL STRIP: NEGATIVE
LEUKOCYTE ESTERASE UR QL STRIP: ABNORMAL
MICROSCOPIC COMMENT: ABNORMAL
NITRITE UR QL STRIP: NEGATIVE
NON-SQ EPI CELLS #/AREA URNS HPF: 2 /HPF
PH UR STRIP: 7 [PH] (ref 5–8)
PROT UR QL STRIP: NEGATIVE
RBC #/AREA URNS HPF: 0 /HPF (ref 0–4)
SP GR UR STRIP: 1.01 (ref 1–1.03)
SQUAMOUS #/AREA URNS HPF: 2 /HPF
URN SPEC COLLECT METH UR: ABNORMAL
WBC #/AREA URNS HPF: 25 /HPF (ref 0–5)

## 2022-08-25 PROCEDURE — 87086 URINE CULTURE/COLONY COUNT: CPT | Performed by: INTERNAL MEDICINE

## 2022-08-25 PROCEDURE — 99499 UNLISTED E&M SERVICE: CPT | Mod: ,,, | Performed by: INTERNAL MEDICINE

## 2022-08-25 PROCEDURE — 81000 URINALYSIS NONAUTO W/SCOPE: CPT | Mod: PO | Performed by: INTERNAL MEDICINE

## 2022-08-25 PROCEDURE — 99499 NO LOS: ICD-10-PCS | Mod: ,,, | Performed by: INTERNAL MEDICINE

## 2022-08-25 RX ORDER — NITROFURANTOIN 25; 75 MG/1; MG/1
100 CAPSULE ORAL 2 TIMES DAILY
Qty: 10 CAPSULE | Refills: 0 | Status: SHIPPED | OUTPATIENT
Start: 2022-08-25 | End: 2022-08-30

## 2022-08-25 RX ORDER — PHENAZOPYRIDINE HYDROCHLORIDE 100 MG/1
100 TABLET, FILM COATED ORAL 3 TIMES DAILY PRN
Qty: 10 TABLET | Refills: 0 | Status: SHIPPED | OUTPATIENT
Start: 2022-08-25 | End: 2022-08-28

## 2022-08-25 NOTE — TELEPHONE ENCOUNTER
I have signed for the following orders AND/OR meds.  Please call the patient and ask the patient to schedule the testing AND/OR inform about any medications that were sent. Medications have been sent to pharmacy listed below      No orders of the defined types were placed in this encounter.      Medications Ordered This Encounter   Medications    nitrofurantoin, macrocrystal-monohydrate, (MACROBID) 100 MG capsule     Sig: Take 1 capsule (100 mg total) by mouth 2 (two) times daily. for 5 days     Dispense:  10 capsule     Refill:  0    phenazopyridine (PYRIDIUM) 100 MG tablet     Sig: Take 1 tablet (100 mg total) by mouth 3 (three) times daily as needed for Pain.     Dispense:  10 tablet     Refill:  0         [id number]  [place] [address] [facility]  [address] [place] [address]  Phone: [phone] Fax: [phone]    [facility] - [place], [place] - [address] AT [facility]  [address]  [facility] [id number]  Phone: [phone] Fax: [phone]

## 2022-08-25 NOTE — PROGRESS NOTES
Results have been released via [employer]. Please verify that these have been viewed by patient. If not, please call patient with results.     I have sent a message to them with the following interpretation (see below).    I have reviewed your recent urine sample which showed some bacteria in the urine indicating infection. I will send an antibiotic to your pharmacy to help with your symptoms.     Please do not hesitate to call or message with any additional questions or concerns.    Pilar Nelson PA-C

## 2022-08-25 NOTE — PROGRESS NOTES
Patient ID: Marnada Zhang is a 71 y.o. female.    Chief Complaint: Urinary Tract Infection    The patient initiated a request through [employer] on 8/25/2022 for evaluation and management with a chief complaint of Urinary Tract Infection     I evaluated the questionnaire submission on 08/25/2022  .    [facility] Peq Evisit Uti Questionnaire    8/25/2022  8:28 AM CDT - Filed by Patient   Do you agree to participate in an E-Visit? Yes   If you have any of the following problems, please present to your local ER or call 911:  I acknowledge   What is the main issue that you would like for your doctor to address today? Burning and frequent urination   Are you able to take your vital signs? No   What symptoms do you currently have? Pain while passing urine   When did your symptoms first appear? 8/15/2022   List what you have done or taken to help your symptoms. Drink lots of water   Please indicate whether you have had the following symptoms during the past 24 hours     Urgent urination (a sudden and uncontrollable urge to urinate) Moderate   Frequent urination of small amounts of urine (going to the toilet very often) Mild   Burning pain when urinating Moderate   Incomplete bladder emptying (still feel like you need to urinate again after urination) Mild   Pain not associated with urination in the lower abdomen below the belly button) None   What does your urine look like? Clear   Blood seen in the urine (without menstrual cycle) None   Flank pain (pain in one or both sides of the lower back) Mild   Abnormal Vaginal Discharge (abnormal amount, color and/or odor) None   Discharge from the urethra (urinary opening) without urination None   High body temperature/fever? None-<99.5   Please rate how much discomfort you have experience because of the symptoms in the past 24 hours: Moderate   Please indicate how the symptoms have interfered with your every day activities/work in the past 24 hours: Moderate   Please indicate how these  symptoms have interfered with your social activities (visiting people, meeting with friends, etc.) in the past 24 hours? None   Are you a diabetic? No   If you are female, please indicate whether you have the following at the time of completion of this questionnaire: Signs of menopause syndrome (hot flashes)   Provide any information you feel is important to your history not asked above [place] visit may have caused it.   Please attach any relevant images or files (if you have performed a home test for UTI, please submit a photo of results)         Active Problem List with Overview Notes    Diagnosis Date Noted    MEG (generalized anxiety disorder) 03/31/2022     -hx of anxiety and depression with recently worsening of symptoms  -currently on zoloft 25 mg without AE  -plan to increase dose to 50 mg QD      Prediabetes 03/31/2022     Lab Results   Component Value Date    HGBA1C 5.7 (H) 12/08/2021   -managing with diet and exercise        Wears hearing aid 03/03/2020    Colon polyp 03/03/2020    Mixed hyperlipidemia 07/22/2019     -chronic condition. Currently stable.    -reports compliance with hyperlipidemia treatment as prescribed  -denies any known adverse effects of medications  -most recent labs listed below; started back on statin after below results  Lab Results   Component Value Date    CHOL 233 (H) 12/08/2021     Lab Results   Component Value Date    HDL 87 (H) 12/08/2021     Lab Results   Component Value Date    LDLCALC 134.2 12/08/2021     Lab Results   Component Value Date    TRIG 59 12/08/2021     Lab Results   Component Value Date    ALT 29 12/08/2021    AST 25 12/08/2021    ALKPHOS 60 12/08/2021    BILITOT 0.7 12/08/2021           BMI 21.0-21.9, adult 03/20/2018     Last Assessment & Plan:   Chronic.  Recommend low fat, low cholesterol diet, increase activity with weight loss.  Complications of Obesity discussed-Coronary Artery Disease, Diabetes Mellitus, Hypertension, sleep apnea, gallbladder  disease, depression, stroke non alcoholic liver disease and high cholesterol.  Recommend increase exercise to 30-60 minutes daily.  Low fat, low cholesterol diet.      Diverticulosis of colon 03/20/2018     Last Assessment & Plan:   Chronic stable. Continue to follow with Dr. Malave.      Osteopenia of multiple sites 03/20/2018    Bilateral sensorineural hearing loss 10/15/2013     Last Assessment & Plan:   Chronic stable. Currently uses hearing aids bilaterally.        Recent Labs Obtained:  No visits with results within 7 Day(s) from this visit.   Latest known visit with results is:   Lab Visit on 06/14/2022   Component Date Value Ref Range Status    Cholesterol 06/14/2022 172  120 - 199 mg/dL Final    Comment: The National Cholesterol Education Program (NCEP) has set the  following guidelines (reference ranges) for Cholesterol:  Optimal.....................<200 mg/dL  Borderline High.............200-239 mg/dL  High........................> or = 240 mg/dL      Triglycerides 06/14/2022 49  30 - 150 mg/dL Final    Comment: The National Cholesterol Education Program (NCEP) has set the  following guidelines (reference values) for triglycerides:  Normal......................<150 mg/dL  Borderline High.............150-199 mg/dL  High........................200-499 mg/dL      HDL 06/14/2022 87 (A) 40 - 75 mg/dL Final    Comment: The National Cholesterol Education Program (NCEP) has set the  following guidelines (reference values) for HDL Cholesterol:  Low...............<40 mg/dL  Optimal...........>60 mg/dL      LDL Cholesterol 06/14/2022 75.2  63.0 - 159.0 mg/dL Final    Comment: The National Cholesterol Education Program (NCEP) has set the  following guidelines (reference values) for LDL Cholesterol:  Optimal.......................<130 mg/dL  Borderline High...............130-159 mg/dL  High..........................160-189 mg/dL  Very High.....................>190 mg/dL      HDL/Cholesterol Ratio 06/14/2022  50.6 (A) 20.0 - 50.0 % Final    Total Cholesterol/HDL Ratio 06/14/2022 2.0  2.0 - 5.0 Final    Non-HDL Cholesterol 06/14/2022 85  mg/dL Final    Comment: Risk category and Non-HDL cholesterol goals:  Coronary heart disease (CHD)or equivalent (10-year risk of CHD >20%):  Non-HDL cholesterol goal     <130 mg/dL  Two or more CHD risk factors and 10-year risk of CHD <= 20%:  Non-HDL cholesterol goal     <160 mg/dL  0 to 1 CHD risk factor:  Non-HDL cholesterol goal     <190 mg/dL      Hemoglobin A1C 06/14/2022 5.6  4.0 - 5.6 % Final    Comment: ADA Screening Guidelines:  5.7-6.4%  Consistent with prediabetes  >or=6.5%  Consistent with diabetes    High levels of fetal hemoglobin interfere with the HbA1C  assay. Heterozygous hemoglobin variants (HbS, HgC, etc)do  not significantly interfere with this assay.   However, presence of multiple variants may affect accuracy.      Estimated Avg Glucose 06/14/2022 114  68 - 131 mg/dL Final    TSH 06/14/2022 1.554  0.400 - 4.000 uIU/mL Final       Encounter Diagnosis   Name Primary?    Dysuria Yes        Orders Placed This Encounter   Procedures    Urinalysis, Reflex to Urine Culture Urine, Clean Catch     Standing Status:   Future     Standing Expiration Date:   10/24/2023     Order Specific Question:   Preferred Collection Type     Answer:   Urine, Clean Catch     Order Specific Question:   Specimen Source     Answer:   Urine      -UA ordered  -treatment pending results.    E-Visit Time Tracking:    Day 1 Time (in minutes): 5     Total Time (in minutes): 5

## 2022-08-27 LAB
BACTERIA UR CULT: NORMAL
BACTERIA UR CULT: NORMAL

## 2022-09-01 ENCOUNTER — TELEPHONE (OUTPATIENT)
Dept: FAMILY MEDICINE | Facility: CLINIC | Age: 72
End: 2022-09-01
Payer: MEDICARE

## 2022-09-01 NOTE — TELEPHONE ENCOUNTER
----- Message from Kymberly Bird sent at 9/1/2022 12:44 PM CDT -----  Regarding: Digital Medicine Patient Discharge Request  Shade Hurd,    Your patient Maranda Zhang was enrolled in [facility]N/DM Digital Medicine on 4/19/22. Unfortunately, she has requested discharge from Digital Medicine monitoring and we wanted to make you aware.     Thanks for your support of Digital Medicine programs! Please let me know if you any questions or concerns.    Sincerely,   Kymberly Bird

## 2022-09-21 ENCOUNTER — PATIENT MESSAGE (OUTPATIENT)
Dept: FAMILY MEDICINE | Facility: CLINIC | Age: 72
End: 2022-09-21
Payer: MEDICARE

## 2022-09-21 DIAGNOSIS — Z79.899 ENCOUNTER FOR LONG-TERM (CURRENT) USE OF HIGH-RISK MEDICATION: Primary | ICD-10-CM

## 2022-09-23 ENCOUNTER — LAB VISIT (OUTPATIENT)
Dept: LAB | Facility: HOSPITAL | Age: 72
End: 2022-09-23
Attending: INTERNAL MEDICINE
Payer: MEDICARE

## 2022-09-23 DIAGNOSIS — E78.2 MIXED HYPERLIPIDEMIA: Chronic | ICD-10-CM

## 2022-09-23 DIAGNOSIS — R73.03 PREDIABETES: ICD-10-CM

## 2022-09-23 DIAGNOSIS — Z79.899 ENCOUNTER FOR LONG-TERM (CURRENT) USE OF HIGH-RISK MEDICATION: ICD-10-CM

## 2022-09-23 LAB
ALBUMIN SERPL BCP-MCNC: 4.4 G/DL (ref 3.5–5.2)
ALP SERPL-CCNC: 62 U/L (ref 55–135)
ALT SERPL W/O P-5'-P-CCNC: 24 U/L (ref 10–44)
ANION GAP SERPL CALC-SCNC: 10 MMOL/L (ref 8–16)
AST SERPL-CCNC: 24 U/L (ref 10–40)
BILIRUB SERPL-MCNC: 0.5 MG/DL (ref 0.1–1)
BUN SERPL-MCNC: 9 MG/DL (ref 8–23)
CALCIUM SERPL-MCNC: 9.8 MG/DL (ref 8.7–10.5)
CHLORIDE SERPL-SCNC: 102 MMOL/L (ref 95–110)
CHOLEST SERPL-MCNC: 180 MG/DL (ref 120–199)
CHOLEST/HDLC SERPL: 1.8 {RATIO} (ref 2–5)
CO2 SERPL-SCNC: 26 MMOL/L (ref 23–29)
CREAT SERPL-MCNC: 0.7 MG/DL (ref 0.5–1.4)
ERYTHROCYTE [DISTWIDTH] IN BLOOD BY AUTOMATED COUNT: 12.6 % (ref 11.5–14.5)
EST. GFR  (NO RACE VARIABLE): >60 ML/MIN/1.73 M^2
ESTIMATED AVG GLUCOSE: 114 MG/DL (ref 68–131)
GLUCOSE SERPL-MCNC: 95 MG/DL (ref 70–110)
HBA1C MFR BLD: 5.6 % (ref 4–5.6)
HCT VFR BLD AUTO: 40.6 % (ref 37–48.5)
HDLC SERPL-MCNC: 98 MG/DL (ref 40–75)
HDLC SERPL: 54.4 % (ref 20–50)
HGB BLD-MCNC: 13.2 G/DL (ref 12–16)
LDLC SERPL CALC-MCNC: 72.4 MG/DL (ref 63–159)
MCH RBC QN AUTO: 31 PG (ref 27–31)
MCHC RBC AUTO-ENTMCNC: 32.5 G/DL (ref 32–36)
MCV RBC AUTO: 95 FL (ref 82–98)
NONHDLC SERPL-MCNC: 82 MG/DL
PLATELET # BLD AUTO: 314 K/UL (ref 150–450)
PMV BLD AUTO: 9.6 FL (ref 9.2–12.9)
POTASSIUM SERPL-SCNC: 4.1 MMOL/L (ref 3.5–5.1)
PROT SERPL-MCNC: 6.8 G/DL (ref 6–8.4)
RBC # BLD AUTO: 4.26 M/UL (ref 4–5.4)
SODIUM SERPL-SCNC: 138 MMOL/L (ref 136–145)
TRIGL SERPL-MCNC: 48 MG/DL (ref 30–150)
WBC # BLD AUTO: 6.74 K/UL (ref 3.9–12.7)

## 2022-09-23 PROCEDURE — 36415 COLL VENOUS BLD VENIPUNCTURE: CPT | Mod: PO | Performed by: INTERNAL MEDICINE

## 2022-09-23 PROCEDURE — 80053 COMPREHEN METABOLIC PANEL: CPT | Performed by: INTERNAL MEDICINE

## 2022-09-23 PROCEDURE — 80061 LIPID PANEL: CPT | Performed by: INTERNAL MEDICINE

## 2022-09-23 PROCEDURE — 83036 HEMOGLOBIN GLYCOSYLATED A1C: CPT | Performed by: INTERNAL MEDICINE

## 2022-09-23 PROCEDURE — 85027 COMPLETE CBC AUTOMATED: CPT | Performed by: INTERNAL MEDICINE

## 2022-09-29 ENCOUNTER — OFFICE VISIT (OUTPATIENT)
Dept: FAMILY MEDICINE | Facility: CLINIC | Age: 72
End: 2022-09-29
Payer: MEDICARE

## 2022-09-29 VITALS
WEIGHT: 149.13 LBS | HEIGHT: 69 IN | SYSTOLIC BLOOD PRESSURE: 136 MMHG | HEART RATE: 75 BPM | DIASTOLIC BLOOD PRESSURE: 85 MMHG | TEMPERATURE: 98 F | BODY MASS INDEX: 22.09 KG/M2

## 2022-09-29 DIAGNOSIS — E78.2 MIXED HYPERLIPIDEMIA: Primary | Chronic | ICD-10-CM

## 2022-09-29 DIAGNOSIS — Z78.0 ASYMPTOMATIC AGE-RELATED POSTMENOPAUSAL STATE: ICD-10-CM

## 2022-09-29 DIAGNOSIS — F41.1 GAD (GENERALIZED ANXIETY DISORDER): ICD-10-CM

## 2022-09-29 DIAGNOSIS — Z12.31 ENCOUNTER FOR SCREENING MAMMOGRAM FOR BREAST CANCER: ICD-10-CM

## 2022-09-29 DIAGNOSIS — Z23 INFLUENZA VACCINE NEEDED: ICD-10-CM

## 2022-09-29 DIAGNOSIS — M85.89 OSTEOPENIA OF MULTIPLE SITES: Chronic | ICD-10-CM

## 2022-09-29 DIAGNOSIS — R73.03 PREDIABETES: ICD-10-CM

## 2022-09-29 PROCEDURE — 99214 PR OFFICE/OUTPT VISIT, EST, LEVL IV, 30-39 MIN: ICD-10-PCS | Mod: S$PBB,,, | Performed by: INTERNAL MEDICINE

## 2022-09-29 PROCEDURE — 99214 OFFICE O/P EST MOD 30 MIN: CPT | Mod: S$PBB,,, | Performed by: INTERNAL MEDICINE

## 2022-09-29 PROCEDURE — 99999 PR PBB SHADOW E&M-EST. PATIENT-LVL IV: CPT | Mod: PBBFAC,,, | Performed by: INTERNAL MEDICINE

## 2022-09-29 PROCEDURE — G0008 ADMIN INFLUENZA VIRUS VAC: HCPCS | Mod: PBBFAC,PO

## 2022-09-29 PROCEDURE — 99214 OFFICE O/P EST MOD 30 MIN: CPT | Mod: PBBFAC,PO,25 | Performed by: INTERNAL MEDICINE

## 2022-09-29 PROCEDURE — 99999 PR PBB SHADOW E&M-EST. PATIENT-LVL IV: ICD-10-PCS | Mod: PBBFAC,,, | Performed by: INTERNAL MEDICINE

## 2022-09-29 NOTE — PROGRESS NOTES
Assessment/Plan:    Problem List Items Addressed This Visit          Psychiatric    MEG (generalized anxiety disorder)    Overview     -symptoms improved with zoloft 50 mg QD without AE            Cardiac/Vascular    Mixed hyperlipidemia - Primary (Chronic)    Overview     -chronic condition. Currently stable.    -off of lipitor for several months and cholesterol is stable  -plan to stay off of medication for now and continue to monitor  -denies any known adverse effects of medications when she was on medication  -most recent labs listed below:  Lab Results   Component Value Date    CHOL 180 09/23/2022     Lab Results   Component Value Date    HDL 98 (H) 09/23/2022     Lab Results   Component Value Date    LDLCALC 72.4 09/23/2022     Lab Results   Component Value Date    TRIG 48 09/23/2022     Lab Results   Component Value Date    ALT 24 09/23/2022    AST 24 09/23/2022    ALKPHOS 62 09/23/2022    BILITOT 0.5 09/23/2022               Endocrine    Prediabetes    Overview     Lab Results   Component Value Date    HGBA1C 5.6 09/23/2022   -managing with diet and exercise              Orthopedic    Osteopenia of multiple sites (Chronic)    Overview     -remains on calcium/vit d  -due for updated DEXA          Other Visit Diagnoses       Influenza vaccine needed        Relevant Orders    Influenza - Quadrivalent (Adjuvanted) (Completed)    Encounter for screening mammogram for breast cancer        Relevant Orders    Mammo Digital Screening Bilat w/ Joe    Asymptomatic age-related postmenopausal state        Relevant Orders    DXA Bone Density Spine And Hip            Follow up in about 6 months (around 3/29/2023).    Jamee Hurd MD  _____________________________________________________________________________________________________________________________________________________    CC: follow up of chronic medical conditions     HPI:    Patient is in clinic today as an established patient.    Reports that she has  been doing well since last visit. Increased zoloft last visit and that did improved anxiety.     She did stop cholesterol medication, just trying to decrease the amount of medications that she has to take. Repeat cholesterol levels have remained stable off of medication.    No other new complaints today.  Remaining chronic conditions have been reviewed and remain stable. Further detail as stated above.     HM reviewed. Flu shot today. MMG and DEXA scheduled.    No recent changes to medical/surgical history.    Current Outpatient Medications on File Prior to Visit   Medication Sig Dispense Refill    calcium carbonate-vitamin D3 500 mg(1,250mg) -400 unit Chew Take 1 tablet by mouth 2 (two) times daily.       fluticasone propionate (FLONASE) 50 mcg/actuation nasal spray Use 2 puffs in each nostril q day. 48 g 2    sertraline (ZOLOFT) 50 MG tablet Take 1 tablet (50 mg total) by mouth once daily. 90 tablet 1    [DISCONTINUED] atorvastatin (LIPITOR) 20 MG tablet TAKE 1 TABLET ONCE DAILY 90 tablet 1     No current facility-administered medications on file prior to visit.       Review of Systems   Constitutional:  Negative for chills, diaphoresis, fatigue and fever.   HENT:  Negative for congestion, ear pain, postnasal drip, sinus pain and sore throat.    Eyes:  Negative for pain and redness.   Respiratory:  Negative for cough, chest tightness and shortness of breath.    Cardiovascular:  Negative for chest pain and leg swelling.   Gastrointestinal:  Negative for abdominal pain, constipation, diarrhea, nausea and vomiting.   Genitourinary:  Negative for dysuria and hematuria.   Musculoskeletal:  Negative for arthralgias and joint swelling.   Skin:  Negative for rash.   Neurological:  Negative for dizziness, syncope and headaches.   Psychiatric/Behavioral:  Negative for dysphoric mood. The patient is not nervous/anxious.      Vitals:    09/29/22 1058   BP: 136/85   Pulse: 75   Temp: 98 °F (36.7 °C)   TempSrc: Oral  "  Weight: 67.7 kg (149 lb 2.3 oz)   Height: 5' 9" (1.753 m)       Wt Readings from Last 3 Encounters:   09/29/22 67.7 kg (149 lb 2.3 oz)   03/31/22 63.5 kg (140 lb)   12/08/21 64.4 kg (142 lb)       Physical Exam  Constitutional:       General: She is not in acute distress.     Appearance: Normal appearance. She is well-developed.   HENT:      Head: Normocephalic and atraumatic.   Eyes:      Conjunctiva/sclera: Conjunctivae normal.   Cardiovascular:      Rate and Rhythm: Normal rate and regular rhythm.      Pulses: Normal pulses.      Heart sounds: Normal heart sounds. No murmur heard.  Pulmonary:      Effort: Pulmonary effort is normal. No respiratory distress.      Breath sounds: Normal breath sounds.   Abdominal:      General: Bowel sounds are normal. There is no distension.      Palpations: Abdomen is soft.      Tenderness: There is no abdominal tenderness.   Musculoskeletal:         General: Normal range of motion.      Cervical back: Normal range of motion and neck supple.   Skin:     General: Skin is warm and dry.      Findings: No rash.   Neurological:      General: No focal deficit present.      Mental Status: She is alert and oriented to person, place, and time.   Psychiatric:         Mood and Affect: Mood normal.         Behavior: Behavior normal.     Health Maintenance   Topic Date Due    DEXA Scan  11/03/2022    Mammogram  12/08/2022    Hemoglobin A1c  03/23/2023    Lipid Panel  09/23/2023    TETANUS VACCINE  02/23/2027    Hepatitis C Screening  Completed       "

## 2022-11-02 ENCOUNTER — PES CALL (OUTPATIENT)
Dept: ADMINISTRATIVE | Facility: CLINIC | Age: 72
End: 2022-11-02
Payer: MEDICARE

## 2022-11-18 ENCOUNTER — OFFICE VISIT (OUTPATIENT)
Dept: FAMILY MEDICINE | Facility: CLINIC | Age: 72
End: 2022-11-18
Payer: MEDICARE

## 2022-11-18 VITALS
BODY MASS INDEX: 21.48 KG/M2 | TEMPERATURE: 98 F | WEIGHT: 145 LBS | SYSTOLIC BLOOD PRESSURE: 137 MMHG | HEART RATE: 72 BPM | OXYGEN SATURATION: 96 % | HEIGHT: 69 IN | DIASTOLIC BLOOD PRESSURE: 83 MMHG

## 2022-11-18 DIAGNOSIS — F41.1 GAD (GENERALIZED ANXIETY DISORDER): ICD-10-CM

## 2022-11-18 DIAGNOSIS — R73.03 PREDIABETES: ICD-10-CM

## 2022-11-18 DIAGNOSIS — Z00.00 ENCOUNTER FOR PREVENTIVE CARE: Primary | ICD-10-CM

## 2022-11-18 DIAGNOSIS — E78.2 MIXED HYPERLIPIDEMIA: Chronic | ICD-10-CM

## 2022-11-18 DIAGNOSIS — M85.89 OSTEOPENIA OF MULTIPLE SITES: Chronic | ICD-10-CM

## 2022-11-18 DIAGNOSIS — K57.30 DIVERTICULOSIS OF COLON: Chronic | ICD-10-CM

## 2022-11-18 DIAGNOSIS — K63.5 POLYP OF COLON, UNSPECIFIED PART OF COLON, UNSPECIFIED TYPE: Chronic | ICD-10-CM

## 2022-11-18 DIAGNOSIS — H90.3 BILATERAL SENSORINEURAL HEARING LOSS: Chronic | ICD-10-CM

## 2022-11-18 DIAGNOSIS — Z97.4 WEARS HEARING AID: Chronic | ICD-10-CM

## 2022-11-18 PROCEDURE — G0439 PPPS, SUBSEQ VISIT: HCPCS | Mod: ,,, | Performed by: NURSE PRACTITIONER

## 2022-11-18 PROCEDURE — 99214 OFFICE O/P EST MOD 30 MIN: CPT | Mod: PBBFAC,PO | Performed by: NURSE PRACTITIONER

## 2022-11-18 PROCEDURE — G0439 PR MEDICARE ANNUAL WELLNESS SUBSEQUENT VISIT: ICD-10-PCS | Mod: ,,, | Performed by: NURSE PRACTITIONER

## 2022-11-18 PROCEDURE — 99999 PR PBB SHADOW E&M-EST. PATIENT-LVL IV: ICD-10-PCS | Mod: PBBFAC,,, | Performed by: NURSE PRACTITIONER

## 2022-11-18 PROCEDURE — 99999 PR PBB SHADOW E&M-EST. PATIENT-LVL IV: CPT | Mod: PBBFAC,,, | Performed by: NURSE PRACTITIONER

## 2022-11-18 RX ORDER — SERTRALINE HYDROCHLORIDE 50 MG/1
50 TABLET, FILM COATED ORAL DAILY
Qty: 90 TABLET | Refills: 1 | Status: SHIPPED | OUTPATIENT
Start: 2022-11-18 | End: 2023-03-27 | Stop reason: SDUPTHER

## 2022-11-18 NOTE — PROGRESS NOTES
"  Maranda Zhang presented for a follow-up Medicare AWV today. The following components were reviewed and updated:    Medical history  Family History  Social history  Allergies and Current Medications  Health Risk Assessment  Health Maintenance  Care Team    **See Completed Assessments for Annual Wellness visit with in the encounter summary    The following assessments were completed:  Depression Screening  Cognitive function Screening  Timed Get Up Test  Whisper Test  PHQ-2  Nutrition screen  ADL  PAQ  Osteoporosis risk  CAGE  Living situation  Vitals:    11/18/22 1311   BP: 137/83   Pulse: 72   Temp: 97.6 °F (36.4 °C)   TempSrc: Other (see comments)   SpO2: 96%   Weight: 65.8 kg (145 lb)   Height: 5' 9" (1.753 m)     Body mass index is 21.41 kg/m².   ]        Diagnoses and health risks identified today and associated recommendations/orders:  1. Encounter for preventive care  Stable  Up to date    2. Mixed hyperlipidemia  Stable  Managed by PCP  Low cholesterol diet recommended  Continue current medications, plan of care  F/U with PCP as advised    3. Prediabetes  Stable  Managed by PCP  Pt currently on low carb diet  Continue current medications, plan of care  F/U with PCP as advised    4. Diverticulosis of colon  Stable  Managed by PCP  Continue current medications, plan of care  F/U with PCP as advised    5. Polyp of colon, unspecified part of colon, unspecified type  Stable  Colonoscopy UTD  Colonoscopy per colon cancer screening guidelines recommended    6. Osteopenia of multiple sites  Stable  Managed by PCP  DEXA scheduled to be done in Dec 2022  Continue current medications, plan of care  F/U with PCP as advised    7. MEG (generalized anxiety disorder)  Stable  Managed by PCP  Consider psychiatry if worsening  Requests medication refill  Continue current medications, plan of care  F/U with PCP as advised  - sertraline (ZOLOFT) 50 MG tablet; Take 1 tablet (50 mg total) by mouth once daily.  Dispense: 90 " tablet; Refill: 1    8. Bilateral sensorineural hearing loss  Stable  Managed by PCP  Wears hearing aids  Continue current medications, plan of care  F/U with PCP as advised    9. Wears hearing aid  Stable  Continue current plan of care    No current opioid use  Annual exam scheduled with PCP  I offered to discuss end of life issues, including information on how to make advance directives that the patient could use to name someone who would make medical decisions on their behalf if they became too ill to make themselves.    ___Patient declined - already done.    _x__Patient is interested, I provided paperwork and offered to discuss  Provided Maranda with a 5-10 year written screening schedule and personal prevention plan. Recommendations were developed using the USPSTF age appropriate recommendations. Education, counseling, and referrals were provided as needed.  After Visit Summary printed and given to patient which includes a list of additional screenings\tests needed.    No follow-ups on file.      Sakshi Emery NP

## 2022-12-08 ENCOUNTER — TELEPHONE (OUTPATIENT)
Dept: FAMILY MEDICINE | Facility: CLINIC | Age: 72
End: 2022-12-08
Payer: MEDICARE

## 2022-12-09 ENCOUNTER — HOSPITAL ENCOUNTER (OUTPATIENT)
Dept: RADIOLOGY | Facility: HOSPITAL | Age: 72
Discharge: HOME | End: 2022-12-09
Attending: INTERNAL MEDICINE
Payer: MEDICARE

## 2022-12-09 VITALS — BODY MASS INDEX: 21.49 KG/M2 | WEIGHT: 145.06 LBS | HEIGHT: 69 IN

## 2022-12-09 DIAGNOSIS — Z78.0 ASYMPTOMATIC AGE-RELATED POSTMENOPAUSAL STATE: ICD-10-CM

## 2022-12-09 DIAGNOSIS — Z12.31 ENCOUNTER FOR SCREENING MAMMOGRAM FOR BREAST CANCER: ICD-10-CM

## 2022-12-09 PROCEDURE — 77063 BREAST TOMOSYNTHESIS BI: CPT | Mod: TC,PO

## 2022-12-09 PROCEDURE — 77067 SCR MAMMO BI INCL CAD: CPT | Mod: 26,,, | Performed by: RADIOLOGY

## 2022-12-09 PROCEDURE — 77080 DXA BONE DENSITY AXIAL: CPT | Mod: 26,,, | Performed by: RADIOLOGY

## 2022-12-09 PROCEDURE — 77067 SCR MAMMO BI INCL CAD: CPT | Mod: TC,PO

## 2022-12-09 PROCEDURE — 77063 BREAST TOMOSYNTHESIS BI: CPT | Mod: 26,,, | Performed by: RADIOLOGY

## 2022-12-09 PROCEDURE — 77063 MAMMO DIGITAL SCREENING BILAT WITH TOMO: ICD-10-PCS | Mod: 26,,, | Performed by: RADIOLOGY

## 2022-12-09 PROCEDURE — 77080 DEXA BONE DENSITY SPINE HIP: ICD-10-PCS | Mod: 26,,, | Performed by: RADIOLOGY

## 2022-12-09 PROCEDURE — 77067 MAMMO DIGITAL SCREENING BILAT WITH TOMO: ICD-10-PCS | Mod: 26,,, | Performed by: RADIOLOGY

## 2022-12-09 PROCEDURE — 77080 DXA BONE DENSITY AXIAL: CPT | Mod: TC,PO

## 2022-12-09 NOTE — PROGRESS NOTES
Results have been released via [employer]. Please verify that these have been viewed by patient. If not, please call patient with results.    I have reviewed your recent results.    [id number]--Your recent DEXA scan continues to show osteopenia.   Continue using weight bearing exercises to help reduce the risk of a fracture.  Continue calcium and Vitamin D 1000 units daily.  We will plan to recheck your DEXA scan in 2 years.     Please let me know if you have any additional questions or concerns about above.

## 2022-12-12 NOTE — PROGRESS NOTES
Normal mammogram, repeat in 1 year, results released through [employer]. Please verify that patient has viewed results. If not, please call patient with interpretation below:    I have reviewed the results of your mammogram and it appears that everything was read as normal.  Based on this, the radiologist has recommended that you recheck a mammogram in 1 year.     Also please see below health maintenance items that are due:    Shingles Vaccine(1 of 2) Never done  COVID-19 Vaccine(4 - Booster for Pfizer series) due on 12/28/2021

## 2023-02-07 ENCOUNTER — PATIENT MESSAGE (OUTPATIENT)
Dept: FAMILY MEDICINE | Facility: CLINIC | Age: 73
End: 2023-02-07
Payer: MEDICARE

## 2023-02-08 ENCOUNTER — OFFICE VISIT (OUTPATIENT)
Dept: FAMILY MEDICINE | Facility: CLINIC | Age: 73
End: 2023-02-08
Payer: MEDICARE

## 2023-02-08 VITALS
WEIGHT: 146.5 LBS | HEIGHT: 69 IN | BODY MASS INDEX: 21.7 KG/M2 | HEART RATE: 79 BPM | SYSTOLIC BLOOD PRESSURE: 126 MMHG | TEMPERATURE: 98 F | DIASTOLIC BLOOD PRESSURE: 80 MMHG

## 2023-02-08 DIAGNOSIS — H66.91 ACUTE RIGHT OTITIS MEDIA: Primary | ICD-10-CM

## 2023-02-08 PROCEDURE — 99999 PR PBB SHADOW E&M-EST. PATIENT-LVL IV: ICD-10-PCS | Mod: PBBFAC,,, | Performed by: PHYSICIAN ASSISTANT

## 2023-02-08 PROCEDURE — 99213 PR OFFICE/OUTPT VISIT, EST, LEVL III, 20-29 MIN: ICD-10-PCS | Mod: S$PBB,,, | Performed by: PHYSICIAN ASSISTANT

## 2023-02-08 PROCEDURE — 99999 PR PBB SHADOW E&M-EST. PATIENT-LVL IV: CPT | Mod: PBBFAC,,, | Performed by: PHYSICIAN ASSISTANT

## 2023-02-08 PROCEDURE — 99213 OFFICE O/P EST LOW 20 MIN: CPT | Mod: S$PBB,,, | Performed by: PHYSICIAN ASSISTANT

## 2023-02-08 PROCEDURE — 99214 OFFICE O/P EST MOD 30 MIN: CPT | Mod: PBBFAC,PO | Performed by: PHYSICIAN ASSISTANT

## 2023-02-08 RX ORDER — AMOXICILLIN 875 MG/1
875 TABLET, FILM COATED ORAL 2 TIMES DAILY
Qty: 10 TABLET | Refills: 0 | Status: SHIPPED | OUTPATIENT
Start: 2023-02-08 | End: 2023-02-13

## 2023-02-08 NOTE — PROGRESS NOTES
Assessment/Plan:    Problem List Items Addressed This Visit    None  Visit Diagnoses       Acute right otitis media    -  Primary    Relevant Medications    amoxicillin (AMOXIL) 875 MG tablet          -start antibiotics as prescribed  -supportive care- rest, increase hydration with water, OTC Tylenol/Ibuprofen for pain/fever  -follow up if no improvement in symptoms   -ER precautions for severe or worsening of symptoms     Follow up if symptoms worsen or fail to improve.    Pilar Nelson PA-C  _____________________________________________________________________________________________________________________________________________________    CC: right ear pain    HPI: Patient is in clinic today as an established patient here for right ear pain. This is a new problem. The current episode started 3 days ago. The problem is gradually worsening. Associated symptoms include R ear fullness and R swollen lymph nodes. Pertinent negatives include no fever, chills, coughing, diaphoresis, headaches, hoarse voice, neck pain, shortness of breath, sneezing, sore throat or swollen glands. Past treatments include none. Patient reports wearing hearing aids. She denies previous ear infections. No other complaints today.     Past Medical History:   Diagnosis Date    Age-related incipient cataract of both eyes 3/20/2018    Last Assessment & Plan:  Chronic stable. Continue to follow with Dr. Alonzo.    Anxiety     Bilateral sensorineural hearing loss 10/15/2013    Last Assessment & Plan:  Chronic stable. Currently uses hearing aids bilaterally.    Colon polyp     Diverticulosis of colon 3/20/2018    Last Assessment & Plan:  Chronic stable. Continue to follow with Dr. Malave.    Hypertension     Mixed hyperlipidemia 7/22/2019    Last Assessment & Plan:  Chronic stable. Currently taking atorvastatin 20 mg daily. Continue to follow with Dr. Campa.  Complications of Hyperlipidemia discussed-Coronary Artery Disease, Stroke.   Recommendations low fat, low salt. low cholesterol diet, increase exercise to 30-60 minutes.    Osteopenia of multiple sites 3/20/2018    Last Assessment & Plan:  Chronic stable.  Currently taking calcium and vitamin D daily. Continue to follow with Dr. Campa.  Complications of osteoporosis is bone fracture.  Recommendations over 64 yo calcium 1500 mg through diet or supplementation and Vitamin D 800 IU daily.  Avoid smoking and heavy alcohol use.  Exercise daily and stay active.  Fall precautions.    Prediabetes 3/4/2020    Transaminitis 3/4/2020    Wears hearing aid      Past Surgical History:   Procedure Laterality Date    BREAST BIOPSY Left     COLONOSCOPY  02/02/2017    Dr. Malave, in procedures tab: diverticulosis, otherwise unremarkable; repeat in 5 years for surveillance    COLONOSCOPY  11/05/2013    Dr. Garcia, in procedures tab: diverticulosis, colon polyps removed; biopsy report in care everywhere    EYE SURGERY  2/19    Cataract    HERNIA REPAIR  5/17    HYSTERECTOMY      LAPAROSCOPIC REPAIR OF FEMORAL HERNIA      TONSILLECTOMY      TOTAL ABDOMINAL HYSTERECTOMY W/ BILATERAL SALPINGOOPHORECTOMY      UPPER GASTROINTESTINAL ENDOSCOPY  ~2000    bleeding ulcer per patient report     Review of patient's allergies indicates:  No Known Allergies  Social History     Tobacco Use    Smoking status: Never    Smokeless tobacco: Never   Substance Use Topics    Alcohol use: Yes     Alcohol/week: 7.0 standard drinks     Types: 7 Glasses of wine per week    Drug use: Never     Family History   Problem Relation Age of Onset    Arthritis Mother     Hypertension Mother     Ovarian cancer Mother     Hearing loss Mother     Emphysema Father     Heart disease Father         Heart Attack    COPD Father     Cancer Brother         Colon    Hypertension Brother     Hyperlipidemia Brother     Colon cancer Brother 70    Crohn's disease Neg Hx     Esophageal cancer Neg Hx     Stomach cancer Neg Hx     Ulcerative colitis Neg Hx   "    Current Outpatient Medications on File Prior to Visit   Medication Sig Dispense Refill    calcium carbonate-vitamin D3 500 mg(1,250mg) -400 unit Chew Take 1 tablet by mouth 2 (two) times daily.       fluticasone propionate (FLONASE) 50 mcg/actuation nasal spray Use 2 puffs in each nostril q day. 48 g 2    sertraline (ZOLOFT) 50 MG tablet Take 1 tablet (50 mg total) by mouth once daily. 90 tablet 1     No current facility-administered medications on file prior to visit.       Review of Systems   Constitutional:  Negative for chills, diaphoresis, fatigue and fever.   HENT:  Positive for ear pain. Negative for congestion, postnasal drip, sinus pain and sore throat.    Eyes:  Negative for pain and redness.   Respiratory:  Negative for cough, chest tightness and shortness of breath.    Cardiovascular:  Negative for chest pain and leg swelling.   Gastrointestinal:  Negative for abdominal pain, constipation, diarrhea, nausea and vomiting.   Genitourinary:  Negative for dysuria and hematuria.   Musculoskeletal:  Negative for arthralgias and joint swelling.   Skin:  Negative for rash.   Neurological:  Negative for dizziness, syncope and headaches.   Psychiatric/Behavioral:  Negative for dysphoric mood. The patient is not nervous/anxious.      Vitals:    02/08/23 1043   BP: 126/80   Pulse: 79   Temp: 97.7 °F (36.5 °C)   TempSrc: Temporal   Weight: 66.5 kg (146 lb 8 oz)   Height: 5' 9" (1.753 m)       Wt Readings from Last 3 Encounters:   02/08/23 66.5 kg (146 lb 8 oz)   12/09/22 65.8 kg (145 lb 1 oz)   11/18/22 65.8 kg (145 lb)       Physical Exam  Constitutional:       General: She is not in acute distress.     Appearance: Normal appearance. She is well-developed.   HENT:      Head: Normocephalic and atraumatic.      Right Ear: Tenderness present. A middle ear effusion is present.      Left Ear: Tympanic membrane normal.      Nose: Nose normal.      Mouth/Throat:      Mouth: Mucous membranes are moist.      Pharynx: " Oropharynx is clear.   Eyes:      Conjunctiva/sclera: Conjunctivae normal.   Cardiovascular:      Rate and Rhythm: Normal rate and regular rhythm.      Pulses: Normal pulses.      Heart sounds: Normal heart sounds. No murmur heard.  Pulmonary:      Effort: Pulmonary effort is normal. No respiratory distress.      Breath sounds: Normal breath sounds.   Abdominal:      General: Bowel sounds are normal. There is no distension.      Palpations: Abdomen is soft.      Tenderness: There is no abdominal tenderness.   Musculoskeletal:         General: Normal range of motion.      Cervical back: Normal range of motion and neck supple.   Lymphadenopathy:      Cervical: No cervical adenopathy.   Skin:     General: Skin is warm and dry.      Findings: No rash.   Neurological:      General: No focal deficit present.      Mental Status: She is alert and oriented to person, place, and time.   Psychiatric:         Mood and Affect: Mood normal.         Behavior: Behavior normal.       Health Maintenance   Topic Date Due    Lipid Panel  09/23/2023    Hemoglobin A1c  09/23/2023    Mammogram  12/09/2023    DEXA Scan  12/09/2024    TETANUS VACCINE  02/23/2027    Hepatitis C Screening  Completed

## 2023-03-27 ENCOUNTER — PATIENT MESSAGE (OUTPATIENT)
Dept: FAMILY MEDICINE | Facility: CLINIC | Age: 73
End: 2023-03-27
Payer: MEDICARE

## 2023-05-18 ENCOUNTER — OFFICE VISIT (OUTPATIENT)
Dept: FAMILY MEDICINE | Facility: CLINIC | Age: 73
End: 2023-05-18
Payer: MEDICARE

## 2023-05-18 ENCOUNTER — LAB VISIT (OUTPATIENT)
Dept: LAB | Facility: HOSPITAL | Age: 73
End: 2023-05-18
Attending: INTERNAL MEDICINE
Payer: MEDICARE

## 2023-05-18 VITALS
SYSTOLIC BLOOD PRESSURE: 126 MMHG | TEMPERATURE: 98 F | BODY MASS INDEX: 21.62 KG/M2 | WEIGHT: 146 LBS | DIASTOLIC BLOOD PRESSURE: 75 MMHG | HEIGHT: 69 IN | HEART RATE: 74 BPM

## 2023-05-18 DIAGNOSIS — F41.1 GAD (GENERALIZED ANXIETY DISORDER): ICD-10-CM

## 2023-05-18 DIAGNOSIS — Z79.899 ENCOUNTER FOR LONG-TERM (CURRENT) USE OF HIGH-RISK MEDICATION: Primary | ICD-10-CM

## 2023-05-18 DIAGNOSIS — Z79.899 ENCOUNTER FOR LONG-TERM (CURRENT) USE OF HIGH-RISK MEDICATION: ICD-10-CM

## 2023-05-18 DIAGNOSIS — M85.89 OSTEOPENIA OF MULTIPLE SITES: Chronic | ICD-10-CM

## 2023-05-18 DIAGNOSIS — E78.2 MIXED HYPERLIPIDEMIA: Chronic | ICD-10-CM

## 2023-05-18 DIAGNOSIS — R73.03 PREDIABETES: ICD-10-CM

## 2023-05-18 DIAGNOSIS — M25.552 PAIN OF LEFT HIP: ICD-10-CM

## 2023-05-18 PROCEDURE — 99999 PR PBB SHADOW E&M-EST. PATIENT-LVL III: CPT | Mod: PBBFAC,,, | Performed by: INTERNAL MEDICINE

## 2023-05-18 PROCEDURE — 82306 VITAMIN D 25 HYDROXY: CPT | Performed by: INTERNAL MEDICINE

## 2023-05-18 PROCEDURE — 84443 ASSAY THYROID STIM HORMONE: CPT | Performed by: INTERNAL MEDICINE

## 2023-05-18 PROCEDURE — 99214 PR OFFICE/OUTPT VISIT, EST, LEVL IV, 30-39 MIN: ICD-10-PCS | Mod: S$PBB,,, | Performed by: INTERNAL MEDICINE

## 2023-05-18 PROCEDURE — 85025 COMPLETE CBC W/AUTO DIFF WBC: CPT | Performed by: INTERNAL MEDICINE

## 2023-05-18 PROCEDURE — 99213 OFFICE O/P EST LOW 20 MIN: CPT | Mod: PBBFAC,PO | Performed by: INTERNAL MEDICINE

## 2023-05-18 PROCEDURE — 36415 COLL VENOUS BLD VENIPUNCTURE: CPT | Mod: PO | Performed by: INTERNAL MEDICINE

## 2023-05-18 PROCEDURE — 83036 HEMOGLOBIN GLYCOSYLATED A1C: CPT | Performed by: INTERNAL MEDICINE

## 2023-05-18 PROCEDURE — 99999 PR PBB SHADOW E&M-EST. PATIENT-LVL III: ICD-10-PCS | Mod: PBBFAC,,, | Performed by: INTERNAL MEDICINE

## 2023-05-18 PROCEDURE — 80053 COMPREHEN METABOLIC PANEL: CPT | Performed by: INTERNAL MEDICINE

## 2023-05-18 PROCEDURE — 99214 OFFICE O/P EST MOD 30 MIN: CPT | Mod: S$PBB,,, | Performed by: INTERNAL MEDICINE

## 2023-05-18 NOTE — PROGRESS NOTES
Assessment/Plan:    Problem List Items Addressed This Visit          Psychiatric    MEG (generalized anxiety disorder)    Overview     -symptoms improved with zoloft 50 mg QD without AE              Cardiac/Vascular    Mixed hyperlipidemia (Chronic)    Overview     -chronic condition. Currently stable.    -off of lipitor for several months and cholesterol is stable  -plan to stay off of medication for now and continue to monitor  -denies any known adverse effects of medications when she was on medication  -most recent labs listed below:  Lab Results   Component Value Date    CHOL 180 09/23/2022     Lab Results   Component Value Date    HDL 98 (H) 09/23/2022     Lab Results   Component Value Date    LDLCALC 72.4 09/23/2022     Lab Results   Component Value Date    TRIG 48 09/23/2022     Lab Results   Component Value Date    ALT 24 09/23/2022    AST 24 09/23/2022    ALKPHOS 62 09/23/2022    BILITOT 0.5 09/23/2022               Endocrine    Prediabetes    Overview     Lab Results   Component Value Date    HGBA1C 5.6 09/23/2022   -managing with diet and exercise              Orthopedic    Osteopenia of multiple sites (Chronic)    Overview     -DEXA Dec 2022- continues to show osteopenia  -remains on calcium/vit d  -continue weight bearing exercises  -repeat DEXA Dec 2024            Other Visit Diagnoses       Encounter for long-term (current) use of high-risk medication    -  Primary    Relevant Orders    Vitamin D (Completed)    CBC Auto Differential (Completed)    TSH (Completed)    Pain of left hip      -recent worsening hip pain, mild  -no decreased range of motion  -remains active  -start exercises  -continue PRN anti-inflammatory  -if symptoms continue, will move forward with further work up            Follow up in about 1 year (around 5/18/2024), or if symptoms worsen or fail to improve.    Jamee Hurd,  MD  _____________________________________________________________________________________________________________________________________________________    CC: follow up of chronic medical conditions     HPI:    Patient is in clinic today as an established patient.    Patient reports that she has been doing well overall. She has recently noticed some worsening L hip pain over the past few months. Reports an aching sensation. Mostly located at lateral left hip. Now having some L sided lower back pain. Denies any decrease in ROM of joints. She is still very active, walking on a daily basis. The pain has not changed her exercise routine. She denies any recent trauma or injury to the area that she is aware.     No other new complaints today.  Remaining chronic conditions have been reviewed and remain stable. Further detail as stated above.     HM reviewed.     No recent changes to medical/surgical history.    Current Outpatient Medications on File Prior to Visit   Medication Sig Dispense Refill    calcium carbonate-vitamin D3 500 mg(1,250mg) -400 unit Chew Take 1 tablet by mouth 2 (two) times daily.       fluticasone propionate (FLONASE) 50 mcg/actuation nasal spray Use 2 puffs in each nostril q day. 48 g 2    sertraline (ZOLOFT) 50 MG tablet Take 1 tablet (50 mg total) by mouth once daily. 90 tablet 3     No current facility-administered medications on file prior to visit.       Review of Systems   Constitutional:  Negative for activity change, chills, diaphoresis, fatigue, fever and unexpected weight change.   HENT:  Positive for hearing loss. Negative for congestion, ear pain, postnasal drip, rhinorrhea, sinus pain, sore throat and trouble swallowing.    Eyes:  Negative for pain, discharge, redness and visual disturbance.   Respiratory:  Negative for cough, chest tightness, shortness of breath and wheezing.    Cardiovascular:  Negative for chest pain, palpitations and leg swelling.   Gastrointestinal:  Negative  "for abdominal pain, blood in stool, constipation, diarrhea, nausea and vomiting.   Endocrine: Negative for polydipsia and polyuria.   Genitourinary:  Negative for difficulty urinating, dysuria, hematuria and menstrual problem.   Musculoskeletal:  Positive for arthralgias. Negative for joint swelling and neck pain.   Skin:  Negative for rash.   Neurological:  Negative for dizziness, syncope, weakness and headaches.   Psychiatric/Behavioral:  Negative for confusion and dysphoric mood. The patient is not nervous/anxious.      Vitals:    05/18/23 1348   BP: 126/75   Pulse: 74   Temp: 97.9 °F (36.6 °C)   Weight: 66.2 kg (146 lb)   Height: 5' 9" (1.753 m)       Wt Readings from Last 3 Encounters:   05/18/23 66.2 kg (146 lb)   02/08/23 66.5 kg (146 lb 8 oz)   12/09/22 65.8 kg (145 lb 1 oz)       Physical Exam  Constitutional:       General: She is not in acute distress.     Appearance: Normal appearance. She is well-developed.   HENT:      Head: Normocephalic and atraumatic.   Eyes:      Conjunctiva/sclera: Conjunctivae normal.   Cardiovascular:      Rate and Rhythm: Normal rate and regular rhythm.      Pulses: Normal pulses.      Heart sounds: Normal heart sounds. No murmur heard.  Pulmonary:      Effort: Pulmonary effort is normal. No respiratory distress.      Breath sounds: Normal breath sounds.   Abdominal:      General: Bowel sounds are normal. There is no distension.      Palpations: Abdomen is soft.      Tenderness: There is no abdominal tenderness.   Musculoskeletal:         General: Normal range of motion.      Cervical back: Normal range of motion and neck supple.   Skin:     General: Skin is warm and dry.      Findings: No rash.   Neurological:      General: No focal deficit present.      Mental Status: She is alert and oriented to person, place, and time.   Psychiatric:         Mood and Affect: Mood normal.         Behavior: Behavior normal.     Health Maintenance   Topic Date Due    Lipid Panel  09/23/2023 "    Mammogram  12/09/2023    Hemoglobin A1c  05/18/2024    DEXA Scan  12/09/2024    TETANUS VACCINE  02/23/2027    Hepatitis C Screening  Completed

## 2023-05-19 LAB
25(OH)D3+25(OH)D2 SERPL-MCNC: 50 NG/ML (ref 30–96)
ALBUMIN SERPL BCP-MCNC: 4.2 G/DL (ref 3.5–5.2)
ALP SERPL-CCNC: 66 U/L (ref 55–135)
ALT SERPL W/O P-5'-P-CCNC: 20 U/L (ref 10–44)
ANION GAP SERPL CALC-SCNC: 9 MMOL/L (ref 8–16)
AST SERPL-CCNC: 19 U/L (ref 10–40)
BASOPHILS # BLD AUTO: 0.05 K/UL (ref 0–0.2)
BASOPHILS NFR BLD: 0.7 % (ref 0–1.9)
BILIRUB SERPL-MCNC: 0.5 MG/DL (ref 0.1–1)
BUN SERPL-MCNC: 9 MG/DL (ref 8–23)
CALCIUM SERPL-MCNC: 9.4 MG/DL (ref 8.7–10.5)
CHLORIDE SERPL-SCNC: 100 MMOL/L (ref 95–110)
CO2 SERPL-SCNC: 28 MMOL/L (ref 23–29)
CREAT SERPL-MCNC: 0.6 MG/DL (ref 0.5–1.4)
DIFFERENTIAL METHOD: ABNORMAL
EOSINOPHIL # BLD AUTO: 0.1 K/UL (ref 0–0.5)
EOSINOPHIL NFR BLD: 1.7 % (ref 0–8)
ERYTHROCYTE [DISTWIDTH] IN BLOOD BY AUTOMATED COUNT: 13 % (ref 11.5–14.5)
EST. GFR  (NO RACE VARIABLE): >60 ML/MIN/1.73 M^2
ESTIMATED AVG GLUCOSE: 111 MG/DL (ref 68–131)
GLUCOSE SERPL-MCNC: 95 MG/DL (ref 70–110)
HBA1C MFR BLD: 5.5 % (ref 4–5.6)
HCT VFR BLD AUTO: 39.4 % (ref 37–48.5)
HGB BLD-MCNC: 12.5 G/DL (ref 12–16)
IMM GRANULOCYTES # BLD AUTO: 0.01 K/UL (ref 0–0.04)
IMM GRANULOCYTES NFR BLD AUTO: 0.1 % (ref 0–0.5)
LYMPHOCYTES # BLD AUTO: 2.1 K/UL (ref 1–4.8)
LYMPHOCYTES NFR BLD: 30 % (ref 18–48)
MCH RBC QN AUTO: 29.6 PG (ref 27–31)
MCHC RBC AUTO-ENTMCNC: 31.7 G/DL (ref 32–36)
MCV RBC AUTO: 93 FL (ref 82–98)
MONOCYTES # BLD AUTO: 0.7 K/UL (ref 0.3–1)
MONOCYTES NFR BLD: 9.7 % (ref 4–15)
NEUTROPHILS # BLD AUTO: 4.1 K/UL (ref 1.8–7.7)
NEUTROPHILS NFR BLD: 57.8 % (ref 38–73)
NRBC BLD-RTO: 0 /100 WBC
PLATELET # BLD AUTO: 393 K/UL (ref 150–450)
PMV BLD AUTO: 10.3 FL (ref 9.2–12.9)
POTASSIUM SERPL-SCNC: 4.1 MMOL/L (ref 3.5–5.1)
PROT SERPL-MCNC: 6.6 G/DL (ref 6–8.4)
RBC # BLD AUTO: 4.23 M/UL (ref 4–5.4)
SODIUM SERPL-SCNC: 137 MMOL/L (ref 136–145)
TSH SERPL DL<=0.005 MIU/L-ACNC: 1.04 UIU/ML (ref 0.4–4)
WBC # BLD AUTO: 7.13 K/UL (ref 3.9–12.7)

## 2023-05-19 NOTE — PROGRESS NOTES
Results have been released via [employer]. Please verify that these have been viewed by patient. If not, please call patient with results.    I have reviewed your recent results.    A1C NORMAL-The A1c is within normal range.     CBC NORMAL-The CBC appears to be stable at this time with no sign of major anemia, abnormal white count or platelet abnormality.    CMP/BMP NORMAL-The electrolytes all appear stable at this time.  This includes kidney functions along with routine electrolytes like sugar, potassium and sodium.  The liver enzymes were noted to be stable also.    TSH NORMAL-The TSH screening indicated a normal function of the thyroid.    VIT D NORMAL-The Vitamin D test shows that there is a normal level of vitamin D at this time in the blood.      Please let me know if you have any additional questions or concerns about above.

## 2023-08-30 ENCOUNTER — PATIENT MESSAGE (OUTPATIENT)
Dept: FAMILY MEDICINE | Facility: CLINIC | Age: 73
End: 2023-08-30
Payer: COMMERCIAL

## 2023-08-30 DIAGNOSIS — H91.90 HEARING LOSS, UNSPECIFIED HEARING LOSS TYPE, UNSPECIFIED LATERALITY: Primary | ICD-10-CM

## 2023-08-31 NOTE — TELEPHONE ENCOUNTER
I have signed for the following orders AND/OR meds.  Please call the patient and ask the patient to schedule the testing AND/OR inform about any medications that were sent. Medications have been sent to pharmacy listed below      Orders Placed This Encounter   Procedures    Ambulatory referral/consult to Audiology     Standing Status:   Future     Standing Expiration Date:   9/30/2024     Referral Priority:   Routine     Referral Type:   Audiology Exam     Referral Reason:   Specialty Services Required     Requested Specialty:   Audiology     Number of Visits Requested:   1              [id number] - [place] - [address]  [address]  [place] [address]  Phone: [phone] Fax: [phone]    [facility] - [place] - [facility] Portal to [facility] Sites  [facility]  [place] [address]  Phone: [phone] Fax: [phone]

## 2023-09-11 ENCOUNTER — PATIENT MESSAGE (OUTPATIENT)
Dept: ADMINISTRATIVE | Facility: HOSPITAL | Age: 73
End: 2023-09-11
Payer: COMMERCIAL

## 2023-09-11 DIAGNOSIS — Z12.31 ENCOUNTER FOR SCREENING MAMMOGRAM FOR MALIGNANT NEOPLASM OF BREAST: Primary | ICD-10-CM

## 2023-09-19 ENCOUNTER — PATIENT OUTREACH (OUTPATIENT)
Dept: ADMINISTRATIVE | Facility: HOSPITAL | Age: 73
End: 2023-09-19
Payer: COMMERCIAL

## 2023-09-19 DIAGNOSIS — Z12.31 ENCOUNTER FOR SCREENING MAMMOGRAM FOR MALIGNANT NEOPLASM OF BREAST: Primary | ICD-10-CM

## 2023-10-19 ENCOUNTER — LAB VISIT (OUTPATIENT)
Dept: LAB | Facility: HOSPITAL | Age: 73
End: 2023-10-19
Attending: INTERNAL MEDICINE
Payer: MEDICARE

## 2023-10-19 DIAGNOSIS — E78.2 MIXED HYPERLIPIDEMIA: Chronic | ICD-10-CM

## 2023-10-19 DIAGNOSIS — R73.03 PREDIABETES: ICD-10-CM

## 2023-10-19 LAB
CHOLEST SERPL-MCNC: 254 MG/DL (ref 120–199)
CHOLEST/HDLC SERPL: 2.8 {RATIO} (ref 2–5)
ESTIMATED AVG GLUCOSE: 111 MG/DL (ref 68–131)
HBA1C MFR BLD: 5.5 % (ref 4–5.6)
HDLC SERPL-MCNC: 92 MG/DL (ref 40–75)
HDLC SERPL: 36.2 % (ref 20–50)
LDLC SERPL CALC-MCNC: 148.6 MG/DL (ref 63–159)
NONHDLC SERPL-MCNC: 162 MG/DL
TRIGL SERPL-MCNC: 67 MG/DL (ref 30–150)

## 2023-10-19 PROCEDURE — 83036 HEMOGLOBIN GLYCOSYLATED A1C: CPT | Performed by: INTERNAL MEDICINE

## 2023-10-19 PROCEDURE — 36415 COLL VENOUS BLD VENIPUNCTURE: CPT | Mod: PO | Performed by: INTERNAL MEDICINE

## 2023-10-19 PROCEDURE — 80061 LIPID PANEL: CPT | Performed by: INTERNAL MEDICINE

## 2023-10-20 NOTE — PROGRESS NOTES
Results have been released via [employer]. Please verify that these have been viewed by patient. If not, please call patient with results.    I have reviewed your recent results.    A1C NORMAL-The A1c is at goal. Normal and stable at 5.5.    LIPID ABNORMAL-Your cholesterol has increased again. You may want to consider restarting cholesterol medication. Definitely make sure you are working on diet and exercise.    Please let me know if you have any additional questions or concerns about above.

## 2023-10-24 ENCOUNTER — PATIENT MESSAGE (OUTPATIENT)
Dept: FAMILY MEDICINE | Facility: CLINIC | Age: 73
End: 2023-10-24
Payer: COMMERCIAL

## 2023-10-24 DIAGNOSIS — E78.2 MIXED HYPERLIPIDEMIA: Primary | Chronic | ICD-10-CM

## 2023-10-24 RX ORDER — ROSUVASTATIN CALCIUM 10 MG/1
10 TABLET, COATED ORAL DAILY
Qty: 90 TABLET | Refills: 3 | Status: SHIPPED | OUTPATIENT
Start: 2023-10-24 | End: 2024-01-20 | Stop reason: SDUPTHER

## 2023-10-24 NOTE — TELEPHONE ENCOUNTER
I have signed for the following orders AND/OR meds.  Please call the patient and ask the patient to schedule the testing AND/OR inform about any medications that were sent. Medications have been sent to pharmacy listed below      No orders of the defined types were placed in this encounter.      Medications Ordered This Encounter   Medications    rosuvastatin (CRESTOR) 10 MG tablet     Sig: Take 1 tablet (10 mg total) by mouth once daily.     Dispense:  90 tablet     Refill:  3         [facility] - [place] - [address]  [address]  [place] [address]  Phone: [phone] Fax: [phone]    [facility] [place] - [facility] AT Portal to [facility]  [place] [address]  Phone: [phone] Fax: [phone]

## 2023-12-12 ENCOUNTER — HOSPITAL ENCOUNTER (OUTPATIENT)
Dept: RADIOLOGY | Facility: HOSPITAL | Age: 73
Discharge: HOME | End: 2023-12-12
Attending: INTERNAL MEDICINE
Payer: MEDICARE

## 2023-12-12 DIAGNOSIS — Z12.31 ENCOUNTER FOR SCREENING MAMMOGRAM FOR MALIGNANT NEOPLASM OF BREAST: ICD-10-CM

## 2023-12-12 PROCEDURE — 77067 SCR MAMMO BI INCL CAD: CPT | Mod: 26,,, | Performed by: RADIOLOGY

## 2023-12-12 PROCEDURE — 77067 MAMMO DIGITAL SCREENING BILAT WITH TOMO: ICD-10-PCS | Mod: 26,,, | Performed by: RADIOLOGY

## 2023-12-12 PROCEDURE — 77067 SCR MAMMO BI INCL CAD: CPT | Mod: TC,PO

## 2023-12-12 PROCEDURE — 77063 BREAST TOMOSYNTHESIS BI: CPT | Mod: 26,,, | Performed by: RADIOLOGY

## 2023-12-12 PROCEDURE — 77063 MAMMO DIGITAL SCREENING BILAT WITH TOMO: ICD-10-PCS | Mod: 26,,, | Performed by: RADIOLOGY

## 2023-12-14 NOTE — PROGRESS NOTES
Normal mammogram, repeat in 1 year, results released through CME. Please verify that patient has viewed results. If not, please call patient with interpretation below:    I have reviewed the results of your mammogram and it appears that everything was read as normal.  Based on this, the radiologist has recommended that you recheck a mammogram in 1 year.     Also please see below health maintenance items that are due:    Shingles Vaccine(1 of 2) Never done  RSV Vaccine (Age 60+ and Pregnant patients)(1 - 1-dose 60+ series) Never done  Influenza Vaccine(1) due on 09/01/2023  COVID-19 Vaccine(6 - 2023-24 season) due on 09/01/2023

## 2024-01-20 DIAGNOSIS — E78.2 MIXED HYPERLIPIDEMIA: Chronic | ICD-10-CM

## 2024-01-20 DIAGNOSIS — F41.1 GAD (GENERALIZED ANXIETY DISORDER): ICD-10-CM

## 2024-01-20 NOTE — TELEPHONE ENCOUNTER
No care due was identified.  [facility] Embedded Care Due Messages. Reference number: [id number].   1/20/2024 6:51:03 AM CST

## 2024-01-22 RX ORDER — ROSUVASTATIN CALCIUM 10 MG/1
10 TABLET, COATED ORAL DAILY
Qty: 90 TABLET | Refills: 1 | Status: SHIPPED | OUTPATIENT
Start: 2024-01-22 | End: 2024-06-03

## 2024-01-22 RX ORDER — SERTRALINE HYDROCHLORIDE 50 MG/1
50 TABLET, FILM COATED ORAL DAILY
Qty: 90 TABLET | Refills: 1 | Status: SHIPPED | OUTPATIENT
Start: 2024-01-22 | End: 2024-06-03

## 2024-01-22 NOTE — TELEPHONE ENCOUNTER
Refill Decision Note   Maranda Zhang  is requesting a refill authorization.  Brief Assessment and Rationale for Refill:  Approve     Medication Therapy Plan:         Comments:     Note composed:8:32 AM 01/22/2024

## 2024-01-24 ENCOUNTER — OFFICE VISIT (OUTPATIENT)
Dept: FAMILY MEDICINE | Facility: CLINIC | Age: 74
End: 2024-01-24
Payer: MEDICARE

## 2024-01-24 VITALS
DIASTOLIC BLOOD PRESSURE: 82 MMHG | SYSTOLIC BLOOD PRESSURE: 141 MMHG | HEART RATE: 75 BPM | HEIGHT: 69 IN | WEIGHT: 157 LBS | BODY MASS INDEX: 23.25 KG/M2

## 2024-01-24 DIAGNOSIS — H90.3 BILATERAL SENSORINEURAL HEARING LOSS: Chronic | ICD-10-CM

## 2024-01-24 DIAGNOSIS — R73.03 PREDIABETES: ICD-10-CM

## 2024-01-24 DIAGNOSIS — F41.1 GAD (GENERALIZED ANXIETY DISORDER): ICD-10-CM

## 2024-01-24 DIAGNOSIS — K63.5 POLYP OF COLON, UNSPECIFIED PART OF COLON, UNSPECIFIED TYPE: Chronic | ICD-10-CM

## 2024-01-24 DIAGNOSIS — M85.89 OSTEOPENIA OF MULTIPLE SITES: Chronic | ICD-10-CM

## 2024-01-24 DIAGNOSIS — Z00.00 ANNUAL PHYSICAL EXAM: Primary | ICD-10-CM

## 2024-01-24 DIAGNOSIS — R06.83 SNORING: ICD-10-CM

## 2024-01-24 DIAGNOSIS — E78.2 MIXED HYPERLIPIDEMIA: Chronic | ICD-10-CM

## 2024-01-24 PROCEDURE — 1126F AMNT PAIN NOTED NONE PRSNT: CPT | Mod: CPTII,S$GLB,, | Performed by: NURSE PRACTITIONER

## 2024-01-24 PROCEDURE — 99999 PR PBB SHADOW E&M-EST. PATIENT-LVL IV: CPT | Mod: PBBFAC,,, | Performed by: NURSE PRACTITIONER

## 2024-01-24 PROCEDURE — 3288F FALL RISK ASSESSMENT DOCD: CPT | Mod: CPTII,S$GLB,, | Performed by: NURSE PRACTITIONER

## 2024-01-24 PROCEDURE — 1159F MED LIST DOCD IN RCRD: CPT | Mod: CPTII,S$GLB,, | Performed by: NURSE PRACTITIONER

## 2024-01-24 PROCEDURE — 1160F RVW MEDS BY RX/DR IN RCRD: CPT | Mod: CPTII,S$GLB,, | Performed by: NURSE PRACTITIONER

## 2024-01-24 PROCEDURE — 3077F SYST BP >= 140 MM HG: CPT | Mod: CPTII,S$GLB,, | Performed by: NURSE PRACTITIONER

## 2024-01-24 PROCEDURE — 1101F PT FALLS ASSESS-DOCD LE1/YR: CPT | Mod: CPTII,S$GLB,, | Performed by: NURSE PRACTITIONER

## 2024-01-24 PROCEDURE — G0439 PPPS, SUBSEQ VISIT: HCPCS | Mod: S$GLB,,, | Performed by: NURSE PRACTITIONER

## 2024-01-24 PROCEDURE — 3079F DIAST BP 80-89 MM HG: CPT | Mod: CPTII,S$GLB,, | Performed by: NURSE PRACTITIONER

## 2024-01-24 PROCEDURE — 1170F FXNL STATUS ASSESSED: CPT | Mod: CPTII,S$GLB,, | Performed by: NURSE PRACTITIONER

## 2024-01-24 NOTE — PROGRESS NOTES
"  Maranda Zhang presented for a  Medicare AWV and comprehensive Health Risk Assessment today. The following components were reviewed and updated:    Medical history  Family History  Social history  Allergies and Current Medications  Health Risk Assessment  Health Maintenance  Care Team         ** See Completed Assessments for Annual Wellness Visit within the encounter summary.**         The following assessments were completed:  Living Situation  CAGE  Depression Screening  Timed Get Up and Go  Whisper Test  Cognitive Function Screening  Nutrition Screening  ADL Screening  PAQ Screening        Vitals:    01/24/24 1254   BP: (!) 141/82   Pulse: 75   Weight: 71.2 kg (157 lb)   Height: 5' 9" (1.753 m)     Body mass index is 23.18 kg/m².  Physical Exam  Vitals and nursing note reviewed.   Constitutional:       General: She is not in acute distress.     Appearance: She is well-developed.   HENT:      Head: Normocephalic and atraumatic.   Eyes:      Pupils: Pupils are equal, round, and reactive to light.   Cardiovascular:      Rate and Rhythm: Normal rate and regular rhythm.   Pulmonary:      Effort: Pulmonary effort is normal.      Breath sounds: Normal breath sounds.   Musculoskeletal:         General: Normal range of motion.      Cervical back: Normal range of motion and neck supple.   Skin:     General: Skin is warm and dry.      Findings: No rash.   Neurological:      Mental Status: She is alert and oriented to person, place, and time.   Psychiatric:         Judgment: Judgment normal.           Diagnoses and health risks identified today and associated recommendations/orders:    1. Annual physical exam  Stable and controlled   Continue medication as prescribed  Continue current treatment plan as previously prescribed with your PCP    2. Mixed hyperlipidemia  Stable and controlled on rosuvastatin  Continue medication as prescribed  Continue current treatment plan as previously prescribed with your PCP    3. " Prediabetes  Stable and controlled on diet  Continue medication as prescribed  Continue current treatment plan as previously prescribed with your PCP    4. BMI 21.0-21.9, adult  Stable and controlled on diet  Continue medication as prescribed  Continue current treatment plan as previously prescribed with your PCP    5. Bilateral sensorineural hearing loss  Stable and controlled with hearing aids  Continue medication as prescribed  Continue current treatment plan as previously prescribed with your PCP    6. MEG (generalized anxiety disorder)  Stable and controlled on zoloft  Continue medication as prescribed  Continue current treatment plan as previously prescribed with your PCP    7. Polyp of colon, unspecified part of colon, unspecified type  Stable and controlled   Continue medication as prescribed  Continue current treatment plan as previously prescribed with your PCP  Routine colonoscopy    8. Osteopenia of multiple sites  Stable and controlled on calcium and Vit D  Continue medication as prescribed  Continue current treatment plan as previously prescribed with your PCP          Review for Opioid Screening: Pt does not have Rx for Opioids (If patient has Rx list here)    Review for Substance Use Disorders: Patient does not use substance (If patient has Rx list here)          I offered to discuss end of life issues, including information on how to make advance directives that the patient could use to name someone who would make medical decisions on their behalf if they became too ill to make themselves.    _x__Patient declined     ___Patient is interested, I provided paperwork and offered to discuss.      Provided Maranda with a 5-10 year written screening schedule and personal prevention plan. Recommendations were developed using the USPSTF age appropriate recommendations. Education, counseling, and referrals were provided as needed. After Visit Summary printed and given to patient which includes a list of  additional screenings\tests needed.    No follow-ups on file.    Sunil Cerna, NP

## 2024-04-16 ENCOUNTER — OFFICE VISIT (OUTPATIENT)
Dept: FAMILY MEDICINE | Facility: CLINIC | Age: 74
End: 2024-04-16
Payer: MEDICARE

## 2024-04-16 VITALS
DIASTOLIC BLOOD PRESSURE: 85 MMHG | RESPIRATION RATE: 16 BRPM | HEART RATE: 79 BPM | SYSTOLIC BLOOD PRESSURE: 137 MMHG | HEIGHT: 69 IN | BODY MASS INDEX: 23.77 KG/M2 | OXYGEN SATURATION: 99 % | WEIGHT: 160.5 LBS

## 2024-04-16 DIAGNOSIS — R63.5 WEIGHT GAIN: ICD-10-CM

## 2024-04-16 DIAGNOSIS — R73.03 PREDIABETES: ICD-10-CM

## 2024-04-16 DIAGNOSIS — E78.2 MIXED HYPERLIPIDEMIA: Chronic | ICD-10-CM

## 2024-04-16 DIAGNOSIS — R06.83 SNORING: ICD-10-CM

## 2024-04-16 DIAGNOSIS — Z79.899 ENCOUNTER FOR LONG-TERM (CURRENT) USE OF MEDICATIONS: ICD-10-CM

## 2024-04-16 DIAGNOSIS — F41.1 GAD (GENERALIZED ANXIETY DISORDER): Primary | ICD-10-CM

## 2024-04-16 DIAGNOSIS — M85.89 OSTEOPENIA OF MULTIPLE SITES: Chronic | ICD-10-CM

## 2024-04-16 PROCEDURE — 3008F BODY MASS INDEX DOCD: CPT | Mod: CPTII,S$GLB,, | Performed by: PHYSICIAN ASSISTANT

## 2024-04-16 PROCEDURE — 3079F DIAST BP 80-89 MM HG: CPT | Mod: CPTII,S$GLB,, | Performed by: PHYSICIAN ASSISTANT

## 2024-04-16 PROCEDURE — 1101F PT FALLS ASSESS-DOCD LE1/YR: CPT | Mod: CPTII,S$GLB,, | Performed by: PHYSICIAN ASSISTANT

## 2024-04-16 PROCEDURE — 3288F FALL RISK ASSESSMENT DOCD: CPT | Mod: CPTII,S$GLB,, | Performed by: PHYSICIAN ASSISTANT

## 2024-04-16 PROCEDURE — 99214 OFFICE O/P EST MOD 30 MIN: CPT | Mod: S$GLB,,, | Performed by: PHYSICIAN ASSISTANT

## 2024-04-16 PROCEDURE — 99999 PR PBB SHADOW E&M-EST. PATIENT-LVL IV: CPT | Mod: PBBFAC,,, | Performed by: PHYSICIAN ASSISTANT

## 2024-04-16 PROCEDURE — 1160F RVW MEDS BY RX/DR IN RCRD: CPT | Mod: CPTII,S$GLB,, | Performed by: PHYSICIAN ASSISTANT

## 2024-04-16 PROCEDURE — 1159F MED LIST DOCD IN RCRD: CPT | Mod: CPTII,S$GLB,, | Performed by: PHYSICIAN ASSISTANT

## 2024-04-16 PROCEDURE — 1126F AMNT PAIN NOTED NONE PRSNT: CPT | Mod: CPTII,S$GLB,, | Performed by: PHYSICIAN ASSISTANT

## 2024-04-16 PROCEDURE — 3075F SYST BP GE 130 - 139MM HG: CPT | Mod: CPTII,S$GLB,, | Performed by: PHYSICIAN ASSISTANT

## 2024-04-16 NOTE — PROGRESS NOTES
Assessment/Plan:    Problem List Items Addressed This Visit          Psychiatric    MEG (generalized anxiety disorder) - Primary    Overview     -symptoms improved with zoloft 50 mg QD without AE            Cardiac/Vascular    Mixed hyperlipidemia (Chronic)    Overview     Hyperlipidemia Medications               rosuvastatin (CRESTOR) 10 MG tablet Take 1 tablet (10 mg total) by mouth once daily.          -chronic condition. Currently stable.    -reports compliance with hyperlipidemia treatment as prescribed  -denies any known adverse effects of medications  -most recent labs listed below:  Lab Results   Component Value Date    CHOL 254 (H) 10/19/2023     Lab Results   Component Value Date    HDL 92 (H) 10/19/2023     Lab Results   Component Value Date    LDLCALC 148.6 10/19/2023     Lab Results   Component Value Date    TRIG 67 10/19/2023     Lab Results   Component Value Date    ALT 20 05/18/2023    AST 19 05/18/2023    ALKPHOS 66 05/18/2023    BILITOT 0.5 05/18/2023                  Relevant Orders    Lipid Panel       Endocrine    Prediabetes    Overview     Lab Results   Component Value Date    HGBA1C 5.5 10/19/2023   -managing with diet and exercise           Relevant Orders    Hemoglobin A1C       Orthopedic    Osteopenia of multiple sites (Chronic)    Overview     -DEXA Dec 2022- continues to show osteopenia  -remains on calcium/vit d  -continue weight bearing exercises  -repeat DEXA Dec 2024          Other Visit Diagnoses       Snoring        Relevant Orders    Ambulatory referral/consult to Sleep Disorders    Weight gain        Relevant Orders    TSH    Encounter for long-term (current) use of medications        Relevant Orders    CBC Auto Differential    Comprehensive Metabolic Panel    Vitamin D            Follow up in about 6 months (around 10/16/2024), or if symptoms worsen or fail to improve.    Pilar Nelson,  PA-C  _____________________________________________________________________________________________________________________________________________________    CC: follow up for chronic conditions     HPI: Patient is in clinic today as an established patient here for follow up for chronic conditions.    Chronic conditions have been reviewed and remain stable. Further detail as stated above.     Patient also reports snoring, periods of not breathing, frequent nocturnal awakenings and daytime sleepiness over the past few months. She is concerned for sleep apnea. She has never had a sleep study or been diagnosed with DORETHA.    No other complaints today.     Past Medical History:   Diagnosis Date    Age-related incipient cataract of both eyes 3/20/2018    Last Assessment & Plan:  Chronic stable. Continue to follow with Dr. Alonzo.    Anxiety     Bilateral sensorineural hearing loss 10/15/2013    Last Assessment & Plan:  Chronic stable. Currently uses hearing aids bilaterally.    Colon polyp     Diverticulosis of colon 3/20/2018    Last Assessment & Plan:  Chronic stable. Continue to follow with Dr. Malave.    Hypertension     Mixed hyperlipidemia 7/22/2019    Last Assessment & Plan:  Chronic stable. Currently taking atorvastatin 20 mg daily. Continue to follow with Dr. Campa.  Complications of Hyperlipidemia discussed-Coronary Artery Disease, Stroke.  Recommendations low fat, low salt. low cholesterol diet, increase exercise to 30-60 minutes.    Osteopenia of multiple sites 3/20/2018    Last Assessment & Plan:  Chronic stable.  Currently taking calcium and vitamin D daily. Continue to follow with Dr. Campa.  Complications of osteoporosis is bone fracture.  Recommendations over 66 yo calcium 1500 mg through diet or supplementation and Vitamin D 800 IU daily.  Avoid smoking and heavy alcohol use.  Exercise daily and stay active.  Fall precautions.    Prediabetes 3/4/2020    Transaminitis 3/4/2020    Wears hearing aid       Past Surgical History:   Procedure Laterality Date    BREAST BIOPSY Left     COLONOSCOPY  02/02/2017    Dr. Malave, in procedures tab: diverticulosis, otherwise unremarkable; repeat in 5 years for surveillance    COLONOSCOPY  11/05/2013    Dr. Garcia, in procedures tab: diverticulosis, colon polyps removed; biopsy report in care everywhere    EYE SURGERY  2/19    Cataract    HERNIA REPAIR  5/17    HYSTERECTOMY      LAPAROSCOPIC REPAIR OF FEMORAL HERNIA      TONSILLECTOMY      TOTAL ABDOMINAL HYSTERECTOMY W/ BILATERAL SALPINGOOPHORECTOMY      UPPER GASTROINTESTINAL ENDOSCOPY  ~2000    bleeding ulcer per patient report     Review of patient's allergies indicates:  No Known Allergies  Social History     Tobacco Use    Smoking status: Never    Smokeless tobacco: Never   Substance Use Topics    Alcohol use: Yes     Alcohol/week: 7.0 standard drinks of alcohol     Types: 7 Glasses of wine per week    Drug use: Never     Family History   Problem Relation Name Age of Onset    Arthritis Mother Shabnam Zhang     Hypertension Mother Shabnam Zhang     Ovarian cancer Mother Shabnam Zhang     Hearing loss Mother Shabnam Zhang     Emphysema Father Francisco     Heart disease Father Francisco         Heart Attack    COPD Father Francisco     Cancer Brother Gurpreet         Colon    Hypertension Brother Gurpreet     Hyperlipidemia Brother Gurpreet     Colon cancer Brother Gurpreet 70    Crohn's disease Neg Hx      Esophageal cancer Neg Hx      Stomach cancer Neg Hx      Ulcerative colitis Neg Hx       Current Outpatient Medications on File Prior to Visit   Medication Sig Dispense Refill    calcium carbonate-vitamin D3 500 mg(1,250mg) -400 unit Chew Take 1 tablet by mouth 2 (two) times daily.       fluticasone propionate (FLONASE) 50 mcg/actuation nasal spray Use 2 puffs in each nostril q day. 48 g 2    rosuvastatin (CRESTOR) 10 MG tablet Take 1 tablet (10 mg total) by mouth once daily. 90 tablet 1    sertraline (ZOLOFT) 50 MG tablet Take 1 tablet  "(50 mg total) by mouth once daily. 90 tablet 1     No current facility-administered medications on file prior to visit.       Review of Systems   Constitutional:  Negative for activity change, chills, diaphoresis, fatigue, fever and unexpected weight change.   HENT:  Negative for congestion, ear pain, postnasal drip, rhinorrhea, sinus pain, sore throat and trouble swallowing.    Eyes:  Negative for pain, discharge, redness and visual disturbance.   Respiratory:  Negative for cough, chest tightness, shortness of breath and wheezing.    Cardiovascular:  Negative for chest pain, palpitations and leg swelling.   Gastrointestinal:  Negative for abdominal pain, blood in stool, constipation, diarrhea, nausea and vomiting.   Endocrine: Negative for polydipsia and polyuria.   Genitourinary:  Negative for difficulty urinating, dysuria, hematuria and menstrual problem.   Musculoskeletal:  Negative for arthralgias, joint swelling and neck pain.   Skin:  Negative for rash.   Neurological:  Negative for dizziness, syncope and headaches.   Psychiatric/Behavioral:  Negative for confusion and dysphoric mood. The patient is not nervous/anxious.        Vitals:    04/16/24 1358   BP: 137/85   Pulse: 79   Resp: 16   SpO2: 99%   Weight: 72.8 kg (160 lb 8 oz)   Height: 5' 9" (1.753 m)       Wt Readings from Last 3 Encounters:   04/16/24 72.8 kg (160 lb 8 oz)   01/24/24 71.2 kg (157 lb)   05/18/23 66.2 kg (146 lb)       Physical Exam  Constitutional:       General: She is not in acute distress.     Appearance: Normal appearance. She is well-developed.   HENT:      Head: Normocephalic and atraumatic.   Eyes:      Conjunctiva/sclera: Conjunctivae normal.   Cardiovascular:      Rate and Rhythm: Normal rate and regular rhythm.      Pulses: Normal pulses.      Heart sounds: Normal heart sounds. No murmur heard.  Pulmonary:      Effort: Pulmonary effort is normal. No respiratory distress.      Breath sounds: Normal breath sounds.   Abdominal:    "   General: Bowel sounds are normal. There is no distension.      Palpations: Abdomen is soft.      Tenderness: There is no abdominal tenderness.   Musculoskeletal:         General: Normal range of motion.      Cervical back: Normal range of motion and neck supple.   Skin:     General: Skin is warm and dry.      Findings: No rash.   Neurological:      General: No focal deficit present.      Mental Status: She is alert and oriented to person, place, and time.   Psychiatric:         Mood and Affect: Mood normal.         Behavior: Behavior normal.         Health Maintenance   Topic Date Due    Shingles Vaccine (1 of 2) Never done    Lipid Panel  10/19/2024    Hemoglobin A1c  10/19/2024    DEXA Scan  12/09/2024    Mammogram  12/12/2024    Colorectal Cancer Screening  02/02/2027    TETANUS VACCINE  02/23/2027    Hepatitis C Screening  Completed

## 2024-04-17 ENCOUNTER — LAB VISIT (OUTPATIENT)
Dept: LAB | Facility: HOSPITAL | Age: 74
End: 2024-04-17
Attending: INTERNAL MEDICINE
Payer: MEDICARE

## 2024-04-17 ENCOUNTER — OFFICE VISIT (OUTPATIENT)
Dept: SLEEP MEDICINE | Facility: CLINIC | Age: 74
End: 2024-04-17
Payer: MEDICARE

## 2024-04-17 VITALS
HEIGHT: 69 IN | DIASTOLIC BLOOD PRESSURE: 76 MMHG | RESPIRATION RATE: 21 BRPM | HEART RATE: 76 BPM | OXYGEN SATURATION: 98 % | WEIGHT: 159.38 LBS | BODY MASS INDEX: 23.6 KG/M2 | SYSTOLIC BLOOD PRESSURE: 112 MMHG

## 2024-04-17 DIAGNOSIS — R63.5 WEIGHT GAIN: ICD-10-CM

## 2024-04-17 DIAGNOSIS — Z79.899 ENCOUNTER FOR LONG-TERM (CURRENT) USE OF MEDICATIONS: ICD-10-CM

## 2024-04-17 DIAGNOSIS — E78.2 MIXED HYPERLIPIDEMIA: Chronic | ICD-10-CM

## 2024-04-17 DIAGNOSIS — R06.83 SNORING: Primary | ICD-10-CM

## 2024-04-17 DIAGNOSIS — R73.03 PREDIABETES: ICD-10-CM

## 2024-04-17 DIAGNOSIS — G47.30 SLEEP DISORDER BREATHING: ICD-10-CM

## 2024-04-17 LAB
25(OH)D3+25(OH)D2 SERPL-MCNC: 48 NG/ML (ref 30–96)
ALBUMIN SERPL BCP-MCNC: 4.2 G/DL (ref 3.5–5.2)
ALP SERPL-CCNC: 50 U/L (ref 55–135)
ALT SERPL W/O P-5'-P-CCNC: 16 U/L (ref 10–44)
ANION GAP SERPL CALC-SCNC: 10 MMOL/L (ref 8–16)
AST SERPL-CCNC: 17 U/L (ref 10–40)
BASOPHILS # BLD AUTO: 0.05 K/UL (ref 0–0.2)
BASOPHILS NFR BLD: 1.1 % (ref 0–1.9)
BILIRUB SERPL-MCNC: 0.5 MG/DL (ref 0.1–1)
BUN SERPL-MCNC: 10 MG/DL (ref 8–23)
CALCIUM SERPL-MCNC: 9.3 MG/DL (ref 8.7–10.5)
CHLORIDE SERPL-SCNC: 105 MMOL/L (ref 95–110)
CHOLEST SERPL-MCNC: 182 MG/DL (ref 120–199)
CHOLEST/HDLC SERPL: 2 {RATIO} (ref 2–5)
CO2 SERPL-SCNC: 27 MMOL/L (ref 23–29)
CREAT SERPL-MCNC: 0.6 MG/DL (ref 0.5–1.4)
DIFFERENTIAL METHOD BLD: NORMAL
EOSINOPHIL # BLD AUTO: 0.1 K/UL (ref 0–0.5)
EOSINOPHIL NFR BLD: 3.2 % (ref 0–8)
ERYTHROCYTE [DISTWIDTH] IN BLOOD BY AUTOMATED COUNT: 13.3 % (ref 11.5–14.5)
EST. GFR  (NO RACE VARIABLE): >60 ML/MIN/1.73 M^2
ESTIMATED AVG GLUCOSE: 117 MG/DL (ref 68–131)
GLUCOSE SERPL-MCNC: 95 MG/DL (ref 70–110)
HBA1C MFR BLD: 5.7 % (ref 4–5.6)
HCT VFR BLD AUTO: 42.8 % (ref 37–48.5)
HDLC SERPL-MCNC: 93 MG/DL (ref 40–75)
HDLC SERPL: 51.1 % (ref 20–50)
HGB BLD-MCNC: 13.7 G/DL (ref 12–16)
IMM GRANULOCYTES # BLD AUTO: 0.02 K/UL (ref 0–0.04)
IMM GRANULOCYTES NFR BLD AUTO: 0.5 % (ref 0–0.5)
LDLC SERPL CALC-MCNC: 78.2 MG/DL (ref 63–159)
LYMPHOCYTES # BLD AUTO: 1.5 K/UL (ref 1–4.8)
LYMPHOCYTES NFR BLD: 34.7 % (ref 18–48)
MCH RBC QN AUTO: 30.2 PG (ref 27–31)
MCHC RBC AUTO-ENTMCNC: 32 G/DL (ref 32–36)
MCV RBC AUTO: 94 FL (ref 82–98)
MONOCYTES # BLD AUTO: 0.6 K/UL (ref 0.3–1)
MONOCYTES NFR BLD: 13.6 % (ref 4–15)
NEUTROPHILS # BLD AUTO: 2 K/UL (ref 1.8–7.7)
NEUTROPHILS NFR BLD: 46.9 % (ref 38–73)
NONHDLC SERPL-MCNC: 89 MG/DL
NRBC BLD-RTO: 0 /100 WBC
PLATELET # BLD AUTO: 196 K/UL (ref 150–450)
PMV BLD AUTO: 10.7 FL (ref 9.2–12.9)
POTASSIUM SERPL-SCNC: 4.1 MMOL/L (ref 3.5–5.1)
PROT SERPL-MCNC: 7.1 G/DL (ref 6–8.4)
RBC # BLD AUTO: 4.54 M/UL (ref 4–5.4)
SODIUM SERPL-SCNC: 142 MMOL/L (ref 136–145)
TRIGL SERPL-MCNC: 54 MG/DL (ref 30–150)
TSH SERPL DL<=0.005 MIU/L-ACNC: 1.41 UIU/ML (ref 0.4–4)
WBC # BLD AUTO: 4.35 K/UL (ref 3.9–12.7)

## 2024-04-17 PROCEDURE — 83036 HEMOGLOBIN GLYCOSYLATED A1C: CPT | Performed by: PHYSICIAN ASSISTANT

## 2024-04-17 PROCEDURE — 3288F FALL RISK ASSESSMENT DOCD: CPT | Mod: CPTII,S$GLB,, | Performed by: NURSE PRACTITIONER

## 2024-04-17 PROCEDURE — 99214 OFFICE O/P EST MOD 30 MIN: CPT | Mod: S$GLB,,, | Performed by: NURSE PRACTITIONER

## 2024-04-17 PROCEDURE — 99999 PR PBB SHADOW E&M-EST. PATIENT-LVL III: CPT | Mod: PBBFAC,,, | Performed by: NURSE PRACTITIONER

## 2024-04-17 PROCEDURE — 82306 VITAMIN D 25 HYDROXY: CPT | Performed by: PHYSICIAN ASSISTANT

## 2024-04-17 PROCEDURE — 1159F MED LIST DOCD IN RCRD: CPT | Mod: CPTII,S$GLB,, | Performed by: NURSE PRACTITIONER

## 2024-04-17 PROCEDURE — 80053 COMPREHEN METABOLIC PANEL: CPT | Performed by: PHYSICIAN ASSISTANT

## 2024-04-17 PROCEDURE — 84443 ASSAY THYROID STIM HORMONE: CPT | Performed by: PHYSICIAN ASSISTANT

## 2024-04-17 PROCEDURE — 85025 COMPLETE CBC W/AUTO DIFF WBC: CPT | Performed by: PHYSICIAN ASSISTANT

## 2024-04-17 PROCEDURE — 1101F PT FALLS ASSESS-DOCD LE1/YR: CPT | Mod: CPTII,S$GLB,, | Performed by: NURSE PRACTITIONER

## 2024-04-17 PROCEDURE — 3008F BODY MASS INDEX DOCD: CPT | Mod: CPTII,S$GLB,, | Performed by: NURSE PRACTITIONER

## 2024-04-17 PROCEDURE — 80061 LIPID PANEL: CPT | Performed by: PHYSICIAN ASSISTANT

## 2024-04-17 PROCEDURE — 1160F RVW MEDS BY RX/DR IN RCRD: CPT | Mod: CPTII,S$GLB,, | Performed by: NURSE PRACTITIONER

## 2024-04-17 PROCEDURE — 3074F SYST BP LT 130 MM HG: CPT | Mod: CPTII,S$GLB,, | Performed by: NURSE PRACTITIONER

## 2024-04-17 PROCEDURE — 3078F DIAST BP <80 MM HG: CPT | Mod: CPTII,S$GLB,, | Performed by: NURSE PRACTITIONER

## 2024-04-17 PROCEDURE — 36415 COLL VENOUS BLD VENIPUNCTURE: CPT | Mod: PO | Performed by: PHYSICIAN ASSISTANT

## 2024-04-17 NOTE — PROGRESS NOTES
Subjective:      Patient ID: Maranda Zhang is a 73 y.o. female.    Chief Complaint: Sleep Apnea    HPI    Patient presents today for evaluation of sleep apnea.  Patient with snoring and witnessed apneas. Patient not having problems falling asleep.    Patient has had symptoms for years.   Wakes hersolf up snoring and feeling like she is not breathing around 3AM many nights. She has woken up wih palpitaitons  Bedtime: 9PM  Wake time: 6AM    STOP - BANG Questionnaire:     1. Snoring : Do you snore loudly ?    Yes    2. Tired : Do you often feel tired, fatigued, or sleepy during daytime?   No    3. Observed: Has anyone observed you stop breathing during your sleep?   Yes    4. Blood pressure : Do you have or are you being treated for high blood pressure?   No    5. BMI :BMI more than 35 kg/m2?   No    6. Age : Age over 50 yr old?   Yes    7. Neck circumference: Neck circumference greater than 40 cm?   No    8. Gender: Gender male?   No    High risk of DORETHA: Yes 5 - 8  Intermediate risk of DORETHA: Yes 3 - 4  Low risk of DORETHA: Yes 0 - 2      References:   STOP Questionnaire   A Tool to Screen Patients for Obstructive Sleep Apnea: FOSTER Sher.C.P.C., Sigifredo Hurley M.B.B.S., Jael Rosado M.D.,Amie Juarez, Ph.D., CLAUDIA Woo.B.B.S.,_ Betzaida Lockett.,_ Mayi Pitts M.D., Mateo Rosales F.R.C.P.C.; Anesthesiology 2008; 108:812-21 Copyright © 2008, the American Society of Anesthesiologists, Inc. [employer]       [place] Questionnaire (validated DORETHA screening questionnaire)    Yes -- Snoring/apnea    No -- Fatigue    Body mass index is Body mass index is 23.54 kg/m²..  (>25 is overweight, >30 is obese)    Blood Pressure = normal blood pressure  (PreHTN 120-139/80-89, Stg1 140-159/90-99, Stg2 >160/>100)  [place] = one of three DORETHA categories are positive (high risk is 2-3 positive categories)          4/17/2024     2:12 PM   EPWORTH SLEEPINESS SCALE   Sitting and  "reading 0   Watching TV 0   Sitting, inactive in a public place (e.g. a theatre or a meeting) 0   As a passenger in a car for an hour without a break 0   Lying down to rest in the afternoon when circumstances permit 0   Sitting and talking to someone 0   Sitting quietly after a lunch without alcohol 0   In a car, while stopped for a few minutes in traffic 0   Total score 0           (validated sleepiness questionnaire with a higher score indicating greater sleepiness; range 0-24)    Patient Active Problem List   Diagnosis    Bilateral sensorineural hearing loss    BMI 21.0-21.9, adult    Diverticulosis of colon    Mixed hyperlipidemia    Osteopenia of multiple sites    Wears hearing aid    Colon polyp    MEG (generalized anxiety disorder)    Prediabetes         /76   Pulse 76   Resp (!) 21   Ht 5' 9" (1.753 m)   Wt 72.3 kg (159 lb 6.3 oz)   SpO2 98%   BMI 23.54 kg/m²   Body mass index is 23.54 kg/m².    Review of Systems   Respiratory:  Positive for snoring.    Psychiatric/Behavioral:  Positive for sleep disturbance.    All other systems reviewed and are negative.        Objective:      Physical Exam  Constitutional:       Appearance: She is well-developed.   HENT:      Head: Normocephalic and atraumatic.      Mouth/Throat:      Comments: Mallampati Score: III  Neck:      Comments:    Cardiovascular:      Rate and Rhythm: Normal rate and regular rhythm.   Pulmonary:      Effort: Pulmonary effort is normal.      Breath sounds: Normal breath sounds.   Abdominal:      Palpations: Abdomen is soft.   Musculoskeletal:         General: Normal range of motion.      Cervical back: Normal range of motion and neck supple.   Skin:     General: Skin is warm and dry.   Neurological:      Mental Status: She is alert and oriented to person, place, and time.   Psychiatric:         Mood and Affect: Mood normal.         Behavior: Behavior normal.       Personal Diagnostic Review      Assessment:     1. Snoring    2. Sleep " disorder breathing       Outpatient Encounter Medications as of 4/17/2024   Medication Sig Dispense Refill    calcium carbonate-vitamin D3 500 mg(1,250mg) -400 unit Chew Take 1 tablet by mouth 2 (two) times daily.       fluticasone propionate (FLONASE) 50 mcg/actuation nasal spray Use 2 puffs in each nostril q day. 48 g 2    rosuvastatin (CRESTOR) 10 MG tablet Take 1 tablet (10 mg total) by mouth once daily. 90 tablet 1    sertraline (ZOLOFT) 50 MG tablet Take 1 tablet (50 mg total) by mouth once daily. 90 tablet 1     No facility-administered encounter medications on file as of 4/17/2024.     Orders Placed This Encounter   Procedures    Home Sleep Study     Standing Status:   Future     Standing Expiration Date:   4/17/2025     Scheduling Instructions:      2 night protocol     Plan:     1. Snoring  -     Ambulatory referral/consult to Sleep Disorders    2. Sleep disorder breathing  -     Home Sleep Study; Future

## 2024-04-18 ENCOUNTER — TELEPHONE (OUTPATIENT)
Dept: PULMONOLOGY | Facility: HOSPITAL | Age: 74
End: 2024-04-18
Payer: MEDICARE

## 2024-04-18 NOTE — PROGRESS NOTES
Results have been released via [employer]. Please verify that these have been viewed by patient. If not, please call patient with results.     I have sent a message to them with the following interpretation (see below).    I have reviewed your recent blood work.     A1C ABNORMAL PREDIABETES-Your A1C was slightly above normal and is considered to be in prediabetes range (5.7-6.4). Diabetes is diagnosed with an A1c of 6.5 and greater. Make sure to work on low carb diet and exercise to prevent progression to diabetes. We will continue to monitor closely.  CBC NORMAL-The CBC appears to be stable at this time with no sign of major anemia, abnormal white count or platelet abnormality.  CMP/BMP NORMAL-The electrolytes all appear stable at this time. This includes kidney functions along with routine electrolytes like sugar, potassium and sodium. The liver enzymes were noted to be stable also.  LIPID NORMAL ON MEDS-The cholesterol panel screening showed levels that are considered at target with the current medications. Continue meds & check annually.  TSH NORMAL-The TSH screening indicated a normal function of the thyroid.  VIT D NORMAL-The Vitamin D test shows that there is a normal level of vitamin D at this time in the blood.      Please do not hesitate to call or message with any additional questions or concerns.    Pilar Nelson PA-C

## 2024-06-02 DIAGNOSIS — E78.2 MIXED HYPERLIPIDEMIA: Chronic | ICD-10-CM

## 2024-06-02 DIAGNOSIS — F41.1 GAD (GENERALIZED ANXIETY DISORDER): ICD-10-CM

## 2024-06-03 ENCOUNTER — PATIENT MESSAGE (OUTPATIENT)
Dept: FAMILY MEDICINE | Facility: CLINIC | Age: 74
End: 2024-06-03
Payer: MEDICARE

## 2024-06-03 RX ORDER — ROSUVASTATIN CALCIUM 10 MG/1
10 TABLET, COATED ORAL
Qty: 90 TABLET | Refills: 0 | Status: SHIPPED | OUTPATIENT
Start: 2024-06-03

## 2024-06-03 RX ORDER — SERTRALINE HYDROCHLORIDE 50 MG/1
50 TABLET, FILM COATED ORAL
Qty: 90 TABLET | Refills: 0 | Status: SHIPPED | OUTPATIENT
Start: 2024-06-03

## 2024-06-03 NOTE — TELEPHONE ENCOUNTER
Provider Staff:  Action required for this patient    Requires appointment      Please see care gap opportunities below in Care Due Message.    Thanks!  [facility]     Appointments      Date Provider   Last Visit   5/18/2023 Jamee Hurd MD   Next Visit   Visit date not found Jamee Hurd MD     Refill Decision Note   Maranda Zhang  is requesting a refill authorization.  Brief Assessment and Rationale for Refill:  Approve     Medication Therapy Plan:         Comments:     Note composed:9:28 AM 06/03/2024

## 2024-06-03 NOTE — TELEPHONE ENCOUNTER
Care Due:                  Date            Visit Type   Department     Provider  --------------------------------------------------------------------------------                                ESTABLISHED   [facility]  Last Visit: 05-      PATIENT      MEDICINE       Jamee Hurd  Next Visit: None Scheduled  None         None Found                                                            Last  Test          Frequency    Reason                     Performed    Due Date  --------------------------------------------------------------------------------    Office Visit  15 months..  rosuvastatin, sertraline.  05-   08-    [facility] Embedded Care Due Messages. Reference number: [id number].   6/02/2024 9:36:12 PM CDT

## 2024-06-10 ENCOUNTER — HOSPITAL ENCOUNTER (OUTPATIENT)
Dept: SLEEP MEDICINE | Facility: HOSPITAL | Age: 74
Discharge: HOME | End: 2024-06-10
Attending: PHYSICIAN ASSISTANT
Payer: MEDICARE

## 2024-06-10 DIAGNOSIS — G47.30 SLEEP DISORDER BREATHING: ICD-10-CM

## 2024-06-10 DIAGNOSIS — G47.33 OSA (OBSTRUCTIVE SLEEP APNEA): Primary | ICD-10-CM

## 2024-06-10 PROCEDURE — 95800 SLP STDY UNATTENDED: CPT | Mod: 26,,, | Performed by: INTERNAL MEDICINE

## 2024-06-10 PROCEDURE — 95806 SLEEP STUDY UNATT&RESP EFFT: CPT | Performed by: INTERNAL MEDICINE

## 2024-06-10 NOTE — PROCEDURES
PHYSICIAN INTERPRETATION AND COMMENTS: Findings are consistent with severe, positional obstructive sleep  apnea(DORETHA).  CLINICAL HISTORY: 73 year old female presented with: 13.5 inch neck, BMI of 23, an [place] sleepiness score of 7, no comorbidities  and symptoms of nocturnal snoring, waking up choking and witnessed apneas. Based on the clinical history,  the patient has a high pre-test probability of having Mild DORETHA.  SLEEP STUDY FINDINGS: Patient underwent a 2 night Home Sleep Test and by behavioral criteria, slept for approximately  12.53 hours, with a sleep latency of 6 minutes and a sleep efficiency of 92.5%. Moderate sleep disordered breathing  (AHI=29) is noted based on a 4% hypopnea desaturation criteria, predominantly in the supine position (62 events/hour).  The patient slept supine 15% of the night based on valid recording time of 11.21 hours and is 2.6 times as likely to have  apneas/hypopneas when supine. When considering more subtle measures of sleep disordered breathing, the overall  respiratory disturbance index is severe(RDI=46) based on a 1% hypopnea desaturation criteria with confirmation by  surrogate arousal indicators. The apneas/hypopneas are accompanied by minimal oxygen desaturation (percent time  below 90% SpO2: 1.3%, Min SpO2: 73.6%). The average desaturation across all sleep disordered breathing events is 3.7%.  Snoring occurs for 22.7% (30 dB) of the study, 13% is very loud. The mean pulse rate is 71 BPM, with frequent pulse rate  variability (43 events with >= 6 BPM increase/decrease per hour).  TREATMENT CONSIDERATIONS: Consider nasal continuous positive airway pressure (CPAP/AutoPAP) as the initial  treatment choice for Severe obstructive sleep apnea. A mandibular advancement splint (MAS) or referral to an ENT  surgeon for modification to the airway should be considered to reduce the risk of mortality caused by Severe DORETHA if the  patient prefers an alternative therapy or the CPAP  "trial is unsuccessful. Based on the DORETHA Supine data in the study, a  Mandibular Advancement Splint (MAS) will likely provide treatment benefit independent of DORETHA severity. The patient  should avoid sleeping supine; the non-supine AHI is 2.6 times less severe than the supine AHI.  DISEASE MANAGEMENT CONSIDERATIONS: None.      [place]  [id number] [facility]  [place] [id number]/Jamee Hurd MD         The sleep study that you ordered is complete.  You have ordered sleep LAB services to perform the sleep study for Maranda Zhang .      Please find Sleep Study result in  the "Media tab" of Chart Review menu.        You can look  for the report in the  Media by the document type "Sleep Study Documents". Alphabetizing  "Document type" column helps to find the SLEEP STUDY report  Faster.       As the ordering provider, you are responsible for reviewing the results and implementing a treatment plan with your patient.    If you need a Sleep Medicine provider to explain the sleep study findings and arrange treatment for the patient, please refer patient for consultation to our Sleep Clinic via [facility] with Ambulatory Consult Sleep.     To do that please place an order for an  "Ambulatory Consult Sleep" -  order , it will go to our clinic work queue for our staff  to contact the patient for an appointment.      For any questions, please contact our sleep lab  staff at [phone] to talk to clinical staff          Mateo Evans MD   "

## 2024-06-11 ENCOUNTER — OFFICE VISIT (OUTPATIENT)
Dept: FAMILY MEDICINE | Facility: CLINIC | Age: 74
End: 2024-06-11
Payer: MEDICARE

## 2024-06-11 VITALS
SYSTOLIC BLOOD PRESSURE: 132 MMHG | HEIGHT: 69 IN | HEART RATE: 87 BPM | WEIGHT: 159.81 LBS | DIASTOLIC BLOOD PRESSURE: 88 MMHG | RESPIRATION RATE: 18 BRPM | BODY MASS INDEX: 23.67 KG/M2

## 2024-06-11 DIAGNOSIS — D22.9 BENIGN SKIN MOLE: Primary | ICD-10-CM

## 2024-06-11 DIAGNOSIS — Z12.83 SKIN EXAM, SCREENING FOR CANCER: ICD-10-CM

## 2024-06-11 PROCEDURE — 3008F BODY MASS INDEX DOCD: CPT | Mod: CPTII,S$GLB,, | Performed by: PHYSICIAN ASSISTANT

## 2024-06-11 PROCEDURE — 1159F MED LIST DOCD IN RCRD: CPT | Mod: CPTII,S$GLB,, | Performed by: PHYSICIAN ASSISTANT

## 2024-06-11 PROCEDURE — 99213 OFFICE O/P EST LOW 20 MIN: CPT | Mod: S$GLB,,, | Performed by: PHYSICIAN ASSISTANT

## 2024-06-11 PROCEDURE — 3288F FALL RISK ASSESSMENT DOCD: CPT | Mod: CPTII,S$GLB,, | Performed by: PHYSICIAN ASSISTANT

## 2024-06-11 PROCEDURE — 1101F PT FALLS ASSESS-DOCD LE1/YR: CPT | Mod: CPTII,S$GLB,, | Performed by: PHYSICIAN ASSISTANT

## 2024-06-11 PROCEDURE — 1160F RVW MEDS BY RX/DR IN RCRD: CPT | Mod: CPTII,S$GLB,, | Performed by: PHYSICIAN ASSISTANT

## 2024-06-11 PROCEDURE — 3075F SYST BP GE 130 - 139MM HG: CPT | Mod: CPTII,S$GLB,, | Performed by: PHYSICIAN ASSISTANT

## 2024-06-11 PROCEDURE — 99999 PR PBB SHADOW E&M-EST. PATIENT-LVL IV: CPT | Mod: PBBFAC,,, | Performed by: PHYSICIAN ASSISTANT

## 2024-06-11 PROCEDURE — 3079F DIAST BP 80-89 MM HG: CPT | Mod: CPTII,S$GLB,, | Performed by: PHYSICIAN ASSISTANT

## 2024-06-11 PROCEDURE — 3044F HG A1C LEVEL LT 7.0%: CPT | Mod: CPTII,S$GLB,, | Performed by: PHYSICIAN ASSISTANT

## 2024-06-11 NOTE — PROGRESS NOTES
Assessment/Plan:    Problem List Items Addressed This Visit    None  Visit Diagnoses       Benign skin mole    -  Primary    Relevant Orders    Ambulatory referral/consult to Dermatology    Skin exam, screening for cancer        Relevant Orders    Ambulatory referral/consult to Dermatology        -small mole on R side of forehead; likely benign  -will refer to dermatology for skin check    Follow up if symptoms worsen or fail to improve.    Pilar Nelson PA-C  _____________________________________________________________________________________________________________________________________________________    CC: mole    HPI: Patient is in clinic today as an established patient here for mole. Patient reports noticing a small mole on the R side of her forehead about 1 year ago. She stated that it has not increased in size since that time. Denies pain, redness or itching. She denies hx of skin cancer. She has never had a skin check. No other complaints today.     Past Medical History:   Diagnosis Date    Age-related incipient cataract of both eyes 3/20/2018    Last Assessment & Plan:  Chronic stable. Continue to follow with Dr. Alonzo.    Anxiety     Bilateral sensorineural hearing loss 10/15/2013    Last Assessment & Plan:  Chronic stable. Currently uses hearing aids bilaterally.    Colon polyp     Diverticulosis of colon 3/20/2018    Last Assessment & Plan:  Chronic stable. Continue to follow with Dr. Malave.    Hypertension     Mixed hyperlipidemia 7/22/2019    Last Assessment & Plan:  Chronic stable. Currently taking atorvastatin 20 mg daily. Continue to follow with Dr. Campa.  Complications of Hyperlipidemia discussed-Coronary Artery Disease, Stroke.  Recommendations low fat, low salt. low cholesterol diet, increase exercise to 30-60 minutes.    Osteopenia of multiple sites 3/20/2018    Last Assessment & Plan:  Chronic stable.  Currently taking calcium and vitamin D daily. Continue to follow with Dr. Campa.   Complications of osteoporosis is bone fracture.  Recommendations over 64 yo calcium 1500 mg through diet or supplementation and Vitamin D 800 IU daily.  Avoid smoking and heavy alcohol use.  Exercise daily and stay active.  Fall precautions.    Prediabetes 3/4/2020    Sleep disorder breathing 6/10/2024    Transaminitis 3/4/2020    Wears hearing aid      Past Surgical History:   Procedure Laterality Date    BREAST BIOPSY Left     COLONOSCOPY  02/02/2017    Dr. Malave, in procedures tab: diverticulosis, otherwise unremarkable; repeat in 5 years for surveillance    COLONOSCOPY  11/05/2013    Dr. Garcia, in procedures tab: diverticulosis, colon polyps removed; biopsy report in care everywhere    EYE SURGERY  2/19    Cataract    HERNIA REPAIR  5/17    HYSTERECTOMY      LAPAROSCOPIC REPAIR OF FEMORAL HERNIA      TONSILLECTOMY      TOTAL ABDOMINAL HYSTERECTOMY W/ BILATERAL SALPINGOOPHORECTOMY      UPPER GASTROINTESTINAL ENDOSCOPY  ~2000    bleeding ulcer per patient report     Review of patient's allergies indicates:  No Known Allergies  Social History     Tobacco Use    Smoking status: Never    Smokeless tobacco: Never   Substance Use Topics    Alcohol use: Yes     Alcohol/week: 7.0 standard drinks of alcohol     Types: 7 Glasses of wine per week    Drug use: Never     Family History   Problem Relation Name Age of Onset    Arthritis Mother Shabnam Zhang     Hypertension Mother Shabnam Zhang     Ovarian cancer Mother Shabnam Zhang     Hearing loss Mother Shabnam Zhang     Emphysema Father Francisco     Heart disease Father Francisco         Heart Attack    COPD Father Francisco     Cancer Brother Gurpreet         Colon    Hypertension Brother Gurpreet     Hyperlipidemia Brother Gurpreet     Colon cancer Brother Gurpreet 70    Crohn's disease Neg Hx      Esophageal cancer Neg Hx      Stomach cancer Neg Hx      Ulcerative colitis Neg Hx       Current Outpatient Medications on File Prior to Visit   Medication Sig Dispense Refill    calcium  "carbonate-vitamin D3 500 mg(1,250mg) -400 unit Chew Take 1 tablet by mouth 2 (two) times daily.       fluticasone propionate (FLONASE) 50 mcg/actuation nasal spray Use 2 puffs in each nostril q day. 48 g 2    rosuvastatin (CRESTOR) 10 MG tablet TAKE 1 TABLET BY MOUTH ONCE  DAILY 90 tablet 0    sertraline (ZOLOFT) 50 MG tablet TAKE 1 TABLET BY MOUTH ONCE  DAILY (Patient not taking: Reported on 6/11/2024) 90 tablet 0     No current facility-administered medications on file prior to visit.       Review of Systems   Constitutional:  Negative for activity change, chills, diaphoresis, fatigue, fever and unexpected weight change.   HENT:  Negative for congestion, ear pain, hearing loss, postnasal drip, rhinorrhea, sinus pain, sore throat and trouble swallowing.    Eyes:  Negative for pain, discharge, redness and visual disturbance.   Respiratory:  Negative for cough, chest tightness, shortness of breath and wheezing.    Cardiovascular:  Negative for chest pain, palpitations and leg swelling.   Gastrointestinal:  Negative for abdominal pain, blood in stool, constipation, diarrhea, nausea and vomiting.   Endocrine: Negative for polydipsia and polyuria.   Genitourinary:  Negative for difficulty urinating, dysuria, hematuria and menstrual problem.   Musculoskeletal:  Negative for arthralgias, joint swelling and neck pain.   Skin:  Negative for rash.   Neurological:  Negative for dizziness, syncope, weakness and headaches.   Psychiatric/Behavioral:  Negative for confusion and dysphoric mood. The patient is not nervous/anxious.        Vitals:    06/11/24 1425   BP: 132/88   BP Location: Right arm   Patient Position: Sitting   Pulse: 87   Resp: 18   Weight: 72.5 kg (159 lb 12.8 oz)   Height: 5' 9" (1.753 m)       Wt Readings from Last 3 Encounters:   06/11/24 72.5 kg (159 lb 12.8 oz)   04/17/24 72.3 kg (159 lb 6.3 oz)   04/16/24 72.8 kg (160 lb 8 oz)       Physical Exam  Constitutional:       General: She is not in acute " distress.     Appearance: Normal appearance. She is well-developed.   HENT:      Head: Normocephalic and atraumatic.   Eyes:      Conjunctiva/sclera: Conjunctivae normal.   Cardiovascular:      Rate and Rhythm: Normal rate and regular rhythm.      Pulses: Normal pulses.      Heart sounds: Normal heart sounds. No murmur heard.  Pulmonary:      Effort: Pulmonary effort is normal. No respiratory distress.      Breath sounds: Normal breath sounds.   Abdominal:      General: Bowel sounds are normal. There is no distension.      Palpations: Abdomen is soft.      Tenderness: There is no abdominal tenderness.   Musculoskeletal:         General: Normal range of motion.      Cervical back: Normal range of motion and neck supple.   Skin:     General: Skin is warm and dry.      Findings: No rash.   Neurological:      General: No focal deficit present.      Mental Status: She is alert and oriented to person, place, and time.   Psychiatric:         Mood and Affect: Mood normal.         Behavior: Behavior normal.             Health Maintenance   Topic Date Due    Shingles Vaccine (1 of 2) Never done    DEXA Scan  12/09/2024    Mammogram  12/12/2024    Lipid Panel  04/17/2025    Hemoglobin A1c  04/17/2025    Colorectal Cancer Screening  02/02/2027    TETANUS VACCINE  02/23/2027    Hepatitis C Screening  Completed

## 2024-06-11 NOTE — PATIENT INSTRUCTIONS
Franklin Low,     If you are due for any health screening(s) below please notify me so we can arrange them to be ordered and scheduled. Most healthy patients at your age complete them, but you are free to accept or refuse.     If you can't do it, I'll definitely understand. If you can, I'd certainly appreciate it!    All of your core healthy metrics are met.

## 2024-06-25 ENCOUNTER — OFFICE VISIT (OUTPATIENT)
Dept: SLEEP MEDICINE | Facility: CLINIC | Age: 74
End: 2024-06-25
Payer: MEDICARE

## 2024-06-25 VITALS
OXYGEN SATURATION: 97 % | RESPIRATION RATE: 16 BRPM | HEIGHT: 69 IN | DIASTOLIC BLOOD PRESSURE: 86 MMHG | SYSTOLIC BLOOD PRESSURE: 128 MMHG | BODY MASS INDEX: 23.6 KG/M2 | WEIGHT: 159.38 LBS | HEART RATE: 80 BPM

## 2024-06-25 DIAGNOSIS — G47.33 OSA (OBSTRUCTIVE SLEEP APNEA): Primary | ICD-10-CM

## 2024-06-25 DIAGNOSIS — G47.8 NON-RESTORATIVE SLEEP: ICD-10-CM

## 2024-06-25 DIAGNOSIS — R06.83 SNORING: ICD-10-CM

## 2024-06-25 PROCEDURE — 3079F DIAST BP 80-89 MM HG: CPT | Mod: CPTII,S$GLB,, | Performed by: NURSE PRACTITIONER

## 2024-06-25 PROCEDURE — 99213 OFFICE O/P EST LOW 20 MIN: CPT | Mod: S$GLB,,, | Performed by: NURSE PRACTITIONER

## 2024-06-25 PROCEDURE — 1160F RVW MEDS BY RX/DR IN RCRD: CPT | Mod: CPTII,S$GLB,, | Performed by: NURSE PRACTITIONER

## 2024-06-25 PROCEDURE — 1101F PT FALLS ASSESS-DOCD LE1/YR: CPT | Mod: CPTII,S$GLB,, | Performed by: NURSE PRACTITIONER

## 2024-06-25 PROCEDURE — 1159F MED LIST DOCD IN RCRD: CPT | Mod: CPTII,S$GLB,, | Performed by: NURSE PRACTITIONER

## 2024-06-25 PROCEDURE — 3008F BODY MASS INDEX DOCD: CPT | Mod: CPTII,S$GLB,, | Performed by: NURSE PRACTITIONER

## 2024-06-25 PROCEDURE — 3044F HG A1C LEVEL LT 7.0%: CPT | Mod: CPTII,S$GLB,, | Performed by: NURSE PRACTITIONER

## 2024-06-25 PROCEDURE — 3288F FALL RISK ASSESSMENT DOCD: CPT | Mod: CPTII,S$GLB,, | Performed by: NURSE PRACTITIONER

## 2024-06-25 PROCEDURE — 99999 PR PBB SHADOW E&M-EST. PATIENT-LVL III: CPT | Mod: PBBFAC,,, | Performed by: NURSE PRACTITIONER

## 2024-06-25 PROCEDURE — 3074F SYST BP LT 130 MM HG: CPT | Mod: CPTII,S$GLB,, | Performed by: NURSE PRACTITIONER

## 2024-06-25 NOTE — PROGRESS NOTES
Subjective:      Patient ID: Maranda Zhang is a 73 y.o. female.    Chief Complaint: No chief complaint on file.    HPI    Patient presents today for evaluation of sleep apnea.  Patient with snoring and witnessed apneas. Patient not having problems falling asleep.    Patient has had symptoms for years.   Wakes hersolf up snoring and feeling like she is not breathing around 3AM many nights. She has woken up wih palpitaitons  Bedtime: 9PM  Wake time: 6AM  She had sleep study    STOP - BANG Questionnaire:     1. Snoring : Do you snore loudly ?    Yes    2. Tired : Do you often feel tired, fatigued, or sleepy during daytime?   No    3. Observed: Has anyone observed you stop breathing during your sleep?   Yes    4. Blood pressure : Do you have or are you being treated for high blood pressure?   No    5. BMI :BMI more than 35 kg/m2?   No    6. Age : Age over 50 yr old?   Yes    7. Neck circumference: Neck circumference greater than 40 cm?   No    8. Gender: Gender male?   No    High risk of DORETHA: Yes 5 - 8  Intermediate risk of DORETHA: Yes 3 - 4  Low risk of DORETHA: Yes 0 - 2      References:   STOP Questionnaire   A Tool to Screen Patients for Obstructive Sleep Apnea: FOSTER Sher.C.P.C., Sigifredo Hurley M.B.B.S., Jael Rosado M.D.,Amie Juarez, Ph.D., Nickolas Whitney M.B.B.S.,_ Betzaida Lockett.,_ Mayi Pitts M.D., Mateo Rosales F.R.C.P.C.; Anesthesiology 2008; 108:812-21 Copyright © 2008, the American Society of Anesthesiologists, Inc. [employer]       [place] Questionnaire (validated DORETHA screening questionnaire)    Yes -- Snoring/apnea    No -- Fatigue    Body mass index is Body mass index is 23.54 kg/m²..  (>25 is overweight, >30 is obese)    Blood Pressure = normal blood pressure  (PreHTN 120-139/80-89, Stg1 140-159/90-99, Stg2 >160/>100)  [place] = one of three DORETHA categories are positive (high risk is 2-3 positive categories)         6/25/2024     1:43 PM   EPWORTH  "SLEEPINESS SCALE   Sitting and reading 2   Watching TV 1   Sitting, inactive in a public place (e.g. a theatre or a meeting) 0   As a passenger in a car for an hour without a break 2   Lying down to rest in the afternoon when circumstances permit 2   Sitting and talking to someone 0   Sitting quietly after a lunch without alcohol 1   In a car, while stopped for a few minutes in traffic 0   Total score 8       (validated sleepiness questionnaire with a higher score indicating greater sleepiness; range 0-24)    Patient Active Problem List   Diagnosis    Bilateral sensorineural hearing loss    BMI 21.0-21.9, adult    Diverticulosis of colon    Mixed hyperlipidemia    Osteopenia of multiple sites    Wears hearing aid    Colon polyp    MEG (generalized anxiety disorder)    Prediabetes    Sleep disorder breathing         /86   Pulse 80   Resp 16   Ht 5' 9" (1.753 m)   Wt 72.3 kg (159 lb 6.3 oz)   SpO2 97%   BMI 23.54 kg/m²   Body mass index is 23.54 kg/m².    Review of Systems   Respiratory:  Positive for snoring.    Psychiatric/Behavioral:  Positive for sleep disturbance.    All other systems reviewed and are negative.        Objective:      Physical Exam  Constitutional:       Appearance: She is well-developed.   HENT:      Head: Normocephalic and atraumatic.      Mouth/Throat:      Comments: Mallampati Score: III  Neck:      Comments:    Cardiovascular:      Rate and Rhythm: Normal rate and regular rhythm.   Pulmonary:      Effort: Pulmonary effort is normal.      Breath sounds: Normal breath sounds.   Abdominal:      Palpations: Abdomen is soft.   Musculoskeletal:         General: Normal range of motion.      Cervical back: Normal range of motion and neck supple.   Skin:     General: Skin is warm and dry.   Neurological:      Mental Status: She is alert and oriented to person, place, and time.   Psychiatric:         Mood and Affect: Mood normal.         Behavior: Behavior normal.       Personal Diagnostic " Review  leon Notes    No notes of this type exist for this encounter.  Procedure Notes    Author Status Last  Updated Created   Mateo Evans MD Signed Mateo Evans MD 6/10/2024  4:37 PM 6/10/2024  4:37 PM          Assoc. Orders Procedures   HOME SLEEP STUDIES HOME SLEEP STUDIES       Pre-Op Dx Post-Op Dx   Sleep disorder breathing None         PHYSICIAN INTERPRETATION AND COMMENTS: Findings are consistent with severe, positional obstructive sleep  apnea(DORETHA).  CLINICAL HISTORY: 73 year old female presented with: 13.5 inch neck, BMI of 23, an [place] sleepiness score of 7, no comorbidities  and symptoms of nocturnal snoring, waking up choking and witnessed apneas. Based on the clinical history,  the patient has a high pre-test probability of having Mild DORETHA.  SLEEP STUDY FINDINGS: Patient underwent a 2 night Home Sleep Test and by behavioral criteria, slept for approximately  12.53 hours, with a sleep latency of 6 minutes and a sleep efficiency of 92.5%. Moderate sleep disordered breathing  (AHI=29) is noted based on a 4% hypopnea desaturation criteria, predominantly in the supine position (62 events/hour).  The patient slept supine 15% of the night based on valid recording time of 11.21 hours and is 2.6 times as likely to have  apneas/hypopneas when supine. When considering more subtle measures of sleep disordered breathing, the overall  respiratory disturbance index is severe(RDI=46) based on a 1% hypopnea desaturation criteria with confirmation by  surrogate arousal indicators. The apneas/hypopneas are accompanied by minimal oxygen desaturation (percent time  below 90% SpO2: 1.3%, Min SpO2: 73.6%). The average desaturation across all sleep disordered breathing events is 3.7%.  Snoring occurs for 22.7% (30 dB) of the study, 13% is very loud. The mean pulse rate is 71 BPM, with frequent pulse rate  variability (43 events with >= 6 BPM increase/decrease per hour).  TREATMENT CONSIDERATIONS:  Consider nasal continuous positive airway pressure (CPAP/AutoPAP) as the initial  treatment choice for Severe obstructive sleep apnea. A mandibular advancement splint (MAS) or referral to an ENT  surgeon for modification to the airway should be considered to reduce the risk of mortality caused by Severe DORETHA if the  patient prefers an alternative therapy or the CPAP trial is unsuccessful. Based on the DORETHA Supine data in the study, a  Mandibular Advancement Splint (MAS) will likely provide treatment benefit independent of DORETHA severity. The patient  should avoid sleeping supine; the non-supine AHI is 2.6 times less severe than the supine AHI.  DISEASE MANAGEMENT CONSIDERATIONS: None.             Assessment:     1. DORETHA (obstructive sleep apnea)    2. Snoring    3. Non-restorative sleep       Outpatient Encounter Medications as of 6/25/2024   Medication Sig Dispense Refill    calcium carbonate-vitamin D3 500 mg(1,250mg) -400 unit Chew Take 1 tablet by mouth 2 (two) times daily.       fluticasone propionate (FLONASE) 50 mcg/actuation nasal spray Use 2 puffs in each nostril q day. 48 g 2    rosuvastatin (CRESTOR) 10 MG tablet TAKE 1 TABLET BY MOUTH ONCE  DAILY 90 tablet 0    sertraline (ZOLOFT) 50 MG tablet TAKE 1 TABLET BY MOUTH ONCE  DAILY (Patient not taking: Reported on 6/11/2024) 90 tablet 0    [DISCONTINUED] rosuvastatin (CRESTOR) 10 MG tablet Take 1 tablet (10 mg total) by mouth once daily. 90 tablet 1    [DISCONTINUED] sertraline (ZOLOFT) 50 MG tablet Take 1 tablet (50 mg total) by mouth once daily. 90 tablet 1     No facility-administered encounter medications on file as of 6/25/2024.     Orders Placed This Encounter   Procedures    CPAP FOR HOME USE     Order Specific Question:   Length of need (1-99 months):     Answer:   99     Order Specific Question:   Type ():     Answer:   Auto CPAP     Order Specific Question:   Auto CPAP pressure setting range (cmH20):     Answer:   5-15     Order Specific Question:    Fulfillment Priority:     Answer:   Level 4:  all others     Order Specific Question:   Humidification ():     Answer:   Heated     Order Specific Question:   Choose ONE mask type and its corresponding cushions and/or pillows:     Answer:    Nasal Mask, 1 per 90 days:  Nasal Cushions, (6 per 90 days):  Nasal Pillows, (6 per 90 days)     Order Specific Question:   Choose EITHER Heated or Non-Heated Tubjing     Answer:    Non-Heated Tubing, 1 per 90 days     Order Specific Question:   All other supplies as needed as listed below:     Answer:    Headgear, 1 per 180 days     Order Specific Question:   All other supplies as needed as listed below:     Answer:    Disposable Filter, 6 per 90 days     Order Specific Question:   All other supplies as needed as listed below:     Answer:    Non-Disposable Filter, 1 per 180 days     Order Specific Question:   All other supplies as needed as listed below:     Answer:    Humidifier Chamber, 1 per 180 days     Order Specific Question:   All other supplies as needed as listed below:     Answer:    Chin Strap, 1 per 180 days     Plan:   AutoPAP and follow up in 10 weeks with download of data card and review of symptoms.    1. DORETHA (obstructive sleep apnea)  -     CPAP FOR HOME USE    2. Snoring    3. Non-restorative sleep

## 2024-07-16 ENCOUNTER — PATIENT MESSAGE (OUTPATIENT)
Dept: SLEEP MEDICINE | Facility: CLINIC | Age: 74
End: 2024-07-16
Payer: MEDICARE

## 2024-07-23 DIAGNOSIS — F41.1 GAD (GENERALIZED ANXIETY DISORDER): ICD-10-CM

## 2024-07-23 RX ORDER — SERTRALINE HYDROCHLORIDE 50 MG/1
50 TABLET, FILM COATED ORAL DAILY
Qty: 90 TABLET | Refills: 0 | Status: SHIPPED | OUTPATIENT
Start: 2024-07-23

## 2024-07-23 NOTE — TELEPHONE ENCOUNTER
No care due was identified.  Health [facility] Embedded Care Due Messages. Reference number: [id number].   7/23/2024 10:29:09 AM CDT

## 2024-07-23 NOTE — TELEPHONE ENCOUNTER
Refill Decision Note   Maranda Zhang  is requesting a refill authorization.  Brief Assessment and Rationale for Refill:  Approve     Medication Therapy Plan:         Comments:     Note composed:5:38 PM 07/23/2024

## 2024-08-04 DIAGNOSIS — E78.2 MIXED HYPERLIPIDEMIA: Chronic | ICD-10-CM

## 2024-08-06 RX ORDER — ROSUVASTATIN CALCIUM 10 MG/1
10 TABLET, COATED ORAL
Qty: 90 TABLET | Refills: 3 | Status: SHIPPED | OUTPATIENT
Start: 2024-08-06

## 2024-09-28 DIAGNOSIS — F41.1 GAD (GENERALIZED ANXIETY DISORDER): ICD-10-CM

## 2024-09-28 NOTE — TELEPHONE ENCOUNTER
Care Due:                  Date            Visit Type   Department     Provider  --------------------------------------------------------------------------------                                ESTABLISHED   [facility] FAMILY  Last Visit: 05-      PATIENT      MEDICINE       Jamee Hurd  Next Visit: None Scheduled  None         None Found                                                            Last  Test          Frequency    Reason                     Performed    Due Date  --------------------------------------------------------------------------------    Office Visit  15 months..  rosuvastatin.............  05-   08-    Health [facility] Embedded Care Due Messages. Reference number: [id number].   9/28/2024 4:14:16 AM CDT

## 2024-09-30 RX ORDER — SERTRALINE HYDROCHLORIDE 50 MG/1
50 TABLET, FILM COATED ORAL
Qty: 90 TABLET | Refills: 3 | Status: SHIPPED | OUTPATIENT
Start: 2024-09-30

## 2024-09-30 NOTE — TELEPHONE ENCOUNTER
Refill Routing Note   Medication(s) are not appropriate for processing by [facility] for the following reason(s):      Patient not seen by provider within 15 months    [facility] action(s):  Defer Care Due:  Appointment due            Appointments  past 12m or future 3m with PCP    Date Provider   Last Visit   Visit date not found Jamee Hurd MD   Next Visit   Visit date not found Jamee Hurd MD   ED visits in past 90 days: 0        Note composed:7:36 AM 09/30/2024

## 2024-12-12 ENCOUNTER — HOSPITAL ENCOUNTER (OUTPATIENT)
Dept: RADIOLOGY | Facility: HOSPITAL | Age: 74
Discharge: HOME | End: 2024-12-12
Attending: INTERNAL MEDICINE
Payer: MEDICARE

## 2024-12-12 DIAGNOSIS — Z12.31 ENCOUNTER FOR SCREENING MAMMOGRAM FOR BREAST CANCER: ICD-10-CM

## 2024-12-12 PROCEDURE — 77067 SCR MAMMO BI INCL CAD: CPT | Mod: 26,,, | Performed by: RADIOLOGY

## 2024-12-12 PROCEDURE — 77063 BREAST TOMOSYNTHESIS BI: CPT | Mod: TC,PO

## 2024-12-12 PROCEDURE — 77063 BREAST TOMOSYNTHESIS BI: CPT | Mod: 26,,, | Performed by: RADIOLOGY

## 2024-12-15 ENCOUNTER — PATIENT MESSAGE (OUTPATIENT)
Dept: ADMINISTRATIVE | Facility: HOSPITAL | Age: 74
End: 2024-12-15
Payer: MEDICARE

## 2024-12-16 DIAGNOSIS — M85.89 OSTEOPENIA OF MULTIPLE SITES: Primary | Chronic | ICD-10-CM

## 2024-12-19 ENCOUNTER — HOSPITAL ENCOUNTER (OUTPATIENT)
Dept: RADIOLOGY | Facility: HOSPITAL | Age: 74
Discharge: HOME | End: 2024-12-19
Attending: INTERNAL MEDICINE
Payer: MEDICARE

## 2024-12-19 DIAGNOSIS — M85.89 OSTEOPENIA OF MULTIPLE SITES: Chronic | ICD-10-CM

## 2024-12-19 PROCEDURE — 77080 DXA BONE DENSITY AXIAL: CPT | Mod: TC,PO

## 2024-12-19 PROCEDURE — 77080 DXA BONE DENSITY AXIAL: CPT | Mod: 26,,, | Performed by: RADIOLOGY

## 2025-01-03 ENCOUNTER — PATIENT MESSAGE (OUTPATIENT)
Dept: FAMILY MEDICINE | Facility: CLINIC | Age: 75
End: 2025-01-03
Payer: MEDICARE

## 2025-03-09 ENCOUNTER — RESULTS FOLLOW-UP (OUTPATIENT)
Dept: FAMILY MEDICINE | Facility: CLINIC | Age: 75
End: 2025-03-09

## 2025-03-10 NOTE — PROGRESS NOTES
Patient's DEXA scan results have been sent via portal. Please verify that patient has viewed results. If not, please call patient with interpretation below:    -Your recent DEXA scan shows osteopenia.    -I recommend that you start using weight bearing exercises to help reduce the risk of a fracture.    -Also, please start taking over the counter calcium 1200 mg daily and Vitamin D3 1000 units daily if you are not already doing so.  -We will plan to recheck your DEXA scan in 2 years.      Also please see below health maintenance items that are due:  Health Maintenance Due   Topic Date Due    Shingles Vaccine (1 of 2) Never done    Influenza Vaccine (1) 09/01/2024    COVID-19 Vaccine (6 - 2024-25 season) 09/01/2024

## 2025-04-22 ENCOUNTER — PATIENT MESSAGE (OUTPATIENT)
Dept: ADMINISTRATIVE | Facility: HOSPITAL | Age: 75
End: 2025-04-22
Payer: MEDICARE

## 2025-04-22 ENCOUNTER — PATIENT OUTREACH (OUTPATIENT)
Dept: ADMINISTRATIVE | Facility: HOSPITAL | Age: 75
End: 2025-04-22
Payer: MEDICARE

## 2025-04-25 ENCOUNTER — LAB VISIT (OUTPATIENT)
Dept: LAB | Facility: HOSPITAL | Age: 75
End: 2025-04-25
Attending: INTERNAL MEDICINE
Payer: MEDICARE

## 2025-04-25 DIAGNOSIS — R73.03 PREDIABETES: ICD-10-CM

## 2025-04-25 DIAGNOSIS — E78.2 MIXED HYPERLIPIDEMIA: Chronic | ICD-10-CM

## 2025-04-25 LAB
CHOLEST SERPL-MCNC: 171 MG/DL (ref 120–199)
CHOLEST/HDLC SERPL: 2.4 {RATIO} (ref 2–5)
EAG (OHS): 117 MG/DL (ref 68–131)
HBA1C MFR BLD: 5.7 % (ref 4–5.6)
HDLC SERPL-MCNC: 71 MG/DL (ref 40–75)
HDLC SERPL: 41.5 % (ref 20–50)
LDLC SERPL CALC-MCNC: 77 MG/DL (ref 63–159)
NONHDLC SERPL-MCNC: 100 MG/DL
TRIGL SERPL-MCNC: 115 MG/DL (ref 30–150)

## 2025-04-25 PROCEDURE — 36415 COLL VENOUS BLD VENIPUNCTURE: CPT | Mod: PO

## 2025-04-25 PROCEDURE — 83036 HEMOGLOBIN GLYCOSYLATED A1C: CPT

## 2025-04-25 PROCEDURE — 82465 ASSAY BLD/SERUM CHOLESTEROL: CPT

## 2025-08-17 DIAGNOSIS — E78.2 MIXED HYPERLIPIDEMIA: Chronic | ICD-10-CM

## 2025-08-18 RX ORDER — ROSUVASTATIN CALCIUM 10 MG/1
10 TABLET, COATED ORAL
Qty: 90 TABLET | Refills: 0 | Status: SHIPPED | OUTPATIENT
Start: 2025-08-18